# Patient Record
Sex: MALE | Race: WHITE | NOT HISPANIC OR LATINO | URBAN - METROPOLITAN AREA
[De-identification: names, ages, dates, MRNs, and addresses within clinical notes are randomized per-mention and may not be internally consistent; named-entity substitution may affect disease eponyms.]

---

## 2024-10-04 ENCOUNTER — HOSPITAL ENCOUNTER (OUTPATIENT)
Dept: RADIOLOGY | Facility: EXTERNAL LOCATION | Age: 54
Discharge: HOME | End: 2024-10-04

## 2024-10-04 ENCOUNTER — OFFICE VISIT (OUTPATIENT)
Dept: ORTHOPEDIC SURGERY | Facility: CLINIC | Age: 54
End: 2024-10-04
Payer: MEDICAID

## 2024-10-04 ENCOUNTER — HOSPITAL ENCOUNTER (OUTPATIENT)
Dept: RADIOLOGY | Facility: CLINIC | Age: 54
Discharge: HOME | End: 2024-10-04
Payer: MEDICAID

## 2024-10-04 VITALS — WEIGHT: 170 LBS | HEIGHT: 68 IN | BODY MASS INDEX: 25.76 KG/M2

## 2024-10-04 DIAGNOSIS — M25.562 ACUTE PAIN OF LEFT KNEE: ICD-10-CM

## 2024-10-04 DIAGNOSIS — M70.42 PREPATELLAR BURSITIS OF LEFT KNEE: Primary | ICD-10-CM

## 2024-10-04 PROCEDURE — 99203 OFFICE O/P NEW LOW 30 MIN: CPT | Performed by: FAMILY MEDICINE

## 2024-10-04 PROCEDURE — 73562 X-RAY EXAM OF KNEE 3: CPT | Mod: LT

## 2024-10-04 PROCEDURE — 3008F BODY MASS INDEX DOCD: CPT | Performed by: FAMILY MEDICINE

## 2024-10-04 PROCEDURE — 20611 DRAIN/INJ JOINT/BURSA W/US: CPT | Performed by: FAMILY MEDICINE

## 2024-10-04 RX ORDER — TRIAMCINOLONE ACETONIDE 40 MG/ML
20 INJECTION, SUSPENSION INTRA-ARTICULAR; INTRAMUSCULAR
Status: COMPLETED | OUTPATIENT
Start: 2024-10-04 | End: 2024-10-04

## 2024-10-04 RX ORDER — LIDOCAINE HYDROCHLORIDE 20 MG/ML
2 INJECTION, SOLUTION INFILTRATION; PERINEURAL
Status: COMPLETED | OUTPATIENT
Start: 2024-10-04 | End: 2024-10-04

## 2024-10-04 NOTE — PROGRESS NOTES
History of Present Illness   Chief Complaint   Patient presents with    Left Knee - Pain     Pain and swelling for at least 15 to twenty years       The patient is 54 y.o. male  here with a complaint of chronic left knee pain and swelling.  Greater than 15 years of symptoms, works as a , is on his knees throughout the day, he is always had swelling below his kneecap, feels like it has gotten worse recently with slightly more pain.  No specific treatment, says he does wear kneepads at work.  He denies any redness or warmth.  He takes over-the-counter medications as needed for pain.  Patient does have history of chronic opiate abuse, says he is planning to go to rehab next month for 30 days, says he is currently using fentanyl to help him sleep    No past medical history on file.    Medication Documentation Review Audit       Reviewed by Deena Mandel MA (Technologist) on 10/04/24 at 0942      Medication Order Taking? Sig Documenting Provider Last Dose Status            No Medications to Display                                   No Known Allergies    Social History     Socioeconomic History    Marital status: Single     Spouse name: Not on file    Number of children: Not on file    Years of education: Not on file    Highest education level: Not on file   Occupational History    Not on file   Tobacco Use    Smoking status: Every Day     Types: Cigarettes    Smokeless tobacco: Never   Substance and Sexual Activity    Alcohol use: Yes     Comment: rarely    Drug use: Not on file    Sexual activity: Not on file   Other Topics Concern    Not on file   Social History Narrative    Not on file     Social Determinants of Health     Financial Resource Strain: Not on File (8/25/2019)    Received from [facility]    Financial Resource Strain     Financial Resource Strain: 0   Food Insecurity: Not on File (8/25/2019)    Received from [facility]    Food Insecurity     Food: 0   Transportation Needs: Not on File (8/25/2019)     Received from [facility]    Transportation Needs     Transportation: 0   Physical Activity: Not on File (2019)    Received from [facility]    Physical Activity     Physical Activity: 0   Stress: Not on File (2019)    Received from [facility]    Stress     Stress: 0   Social Connections: Not on File (2019)    Received from [facility]    Social Connections     Social Connections and Isolation: 0   Intimate Partner Violence: Not on file   Housing Stability: Not on File (2019)    Received from [facility]    Housing Stability     Housin       No past surgical history on file.       Review of Systems   GENERAL: Negative  GI: Negative  MUSCULOSKELETAL: See HPI  SKIN: Negative  NEURO:  Negative     Physical Exam:    General/Constitutional: well appearing, no distress, appears stated age  HEENT: sclera clear  Respiratory: non labored breathing  Vascular: No edema, swelling or tenderness, except as noted in detailed exam.  Integumentary: No impressive skin lesions present, except as noted in detailed exam.  Neurological:  Alert and oriented   Psychological:  Normal mood and affect.  Musculoskeletal: Normal, except as noted in detailed exam and in HPI normal gait, unassisted    Left knee: There is some swelling, fluctuance at the prepatellar bursa, there is no overlying redness or warmth, there is some mild tenderness to palpation here.  He has good range of motion at the knee without pain, no motor deficit pain with strength testing, no gross ligamentous laxity       Imaging: X-rays of left knee obtained today and independently reviewed, there is mild degenerative changes of the knee joint, there is soft tissue swelling at the prepatellar bursa    L Inj/Asp: L patellar bursa on 10/4/2024 12:33 PM  Indications: pain  Details: 22 G needle, ultrasound-guided superolateral approach  Medications: 20 mg triamcinolone acetonide 40 mg/mL; 2 mL lidocaine 20 mg/mL (2 %)  Outcome: tolerated well, no immediate complications  Procedure,  treatment alternatives, risks and benefits explained, specific risks discussed. Consent was given by the patient. Immediately prior to procedure a time out was called to verify the correct patient, procedure, equipment, support staff and site/side marked as required. Patient was prepped and draped in the usual sterile fashion.               Assessment   1. Prepatellar bursitis of left knee        2. Acute pain of left knee  XR knee left 3 views    Point of Care Ultrasound            Plan: Left knee pain/swelling secondary to prepatellar bursitis secondary to kneeling as a .  Discussed further workup and treatment.  Explained that this is a benign condition, no treatment is necessary but given worsening pain, swelling we did proceed with ultrasound-guided bursa aspiration followed by Kenalog injection, that we did provide compression with Ace bandage, recommended he wear this for the next 2 weeks, continue to avoid direct pressure on his knees with work.  Discussed potential for symptoms to recur.  Patient voiced understanding.  He will follow-up as needed.

## 2024-12-02 ENCOUNTER — APPOINTMENT (OUTPATIENT)
Dept: NEUROSURGERY | Facility: CLINIC | Age: 54
End: 2024-12-02
Payer: MEDICAID

## 2024-12-10 ENCOUNTER — APPOINTMENT (OUTPATIENT)
Dept: RADIOLOGY | Facility: HOSPITAL | Age: 54
End: 2024-12-10
Payer: MEDICAID

## 2024-12-10 ENCOUNTER — HOSPITAL ENCOUNTER (INPATIENT)
Facility: HOSPITAL | Age: 54
LOS: 3 days | Discharge: HOME | End: 2024-12-14
Attending: STUDENT IN AN ORGANIZED HEALTH CARE EDUCATION/TRAINING PROGRAM | Admitting: INTERNAL MEDICINE
Payer: MEDICAID

## 2024-12-10 DIAGNOSIS — F41.9 ANXIETY: ICD-10-CM

## 2024-12-10 DIAGNOSIS — L03.211 CELLULITIS OF FACE: Primary | ICD-10-CM

## 2024-12-10 DIAGNOSIS — F11.90 OPIOID USE: ICD-10-CM

## 2024-12-10 LAB
ALBUMIN SERPL BCP-MCNC: 4.3 G/DL (ref 3.4–5)
ALP SERPL-CCNC: 92 U/L (ref 33–120)
ALT SERPL W P-5'-P-CCNC: 12 U/L (ref 10–52)
ANION GAP SERPL CALC-SCNC: 13 MMOL/L (ref 10–20)
AST SERPL W P-5'-P-CCNC: 10 U/L (ref 9–39)
BASOPHILS # BLD AUTO: 0.06 X10*3/UL (ref 0–0.1)
BASOPHILS NFR BLD AUTO: 0.5 %
BILIRUB SERPL-MCNC: 0.6 MG/DL (ref 0–1.2)
BUN SERPL-MCNC: 12 MG/DL (ref 6–23)
CALCIUM SERPL-MCNC: 9.5 MG/DL (ref 8.6–10.3)
CHLORIDE SERPL-SCNC: 102 MMOL/L (ref 98–107)
CO2 SERPL-SCNC: 24 MMOL/L (ref 21–32)
CREAT SERPL-MCNC: 1.28 MG/DL (ref 0.5–1.3)
EGFRCR SERPLBLD CKD-EPI 2021: 67 ML/MIN/1.73M*2
EOSINOPHIL # BLD AUTO: 0.2 X10*3/UL (ref 0–0.7)
EOSINOPHIL NFR BLD AUTO: 1.7 %
ERYTHROCYTE [DISTWIDTH] IN BLOOD BY AUTOMATED COUNT: 13.2 % (ref 11.5–14.5)
GLUCOSE SERPL-MCNC: 103 MG/DL (ref 74–99)
HCT VFR BLD AUTO: 37.2 % (ref 41–52)
HGB BLD-MCNC: 12.7 G/DL (ref 13.5–17.5)
IMM GRANULOCYTES # BLD AUTO: 0.05 X10*3/UL (ref 0–0.7)
IMM GRANULOCYTES NFR BLD AUTO: 0.4 % (ref 0–0.9)
LACTATE SERPL-SCNC: 0.8 MMOL/L (ref 0.4–2)
LYMPHOCYTES # BLD AUTO: 1.69 X10*3/UL (ref 1.2–4.8)
LYMPHOCYTES NFR BLD AUTO: 14.2 %
MCH RBC QN AUTO: 30.5 PG (ref 26–34)
MCHC RBC AUTO-ENTMCNC: 34.1 G/DL (ref 32–36)
MCV RBC AUTO: 89 FL (ref 80–100)
MONOCYTES # BLD AUTO: 0.97 X10*3/UL (ref 0.1–1)
MONOCYTES NFR BLD AUTO: 8.2 %
NEUTROPHILS # BLD AUTO: 8.89 X10*3/UL (ref 1.2–7.7)
NEUTROPHILS NFR BLD AUTO: 75 %
NRBC BLD-RTO: 0 /100 WBCS (ref 0–0)
PLATELET # BLD AUTO: 355 X10*3/UL (ref 150–450)
POTASSIUM SERPL-SCNC: 4.9 MMOL/L (ref 3.5–5.3)
PROT SERPL-MCNC: 6.8 G/DL (ref 6.4–8.2)
RBC # BLD AUTO: 4.17 X10*6/UL (ref 4.5–5.9)
SODIUM SERPL-SCNC: 134 MMOL/L (ref 136–145)
WBC # BLD AUTO: 11.9 X10*3/UL (ref 4.4–11.3)

## 2024-12-10 PROCEDURE — 76377 3D RENDER W/INTRP POSTPROCES: CPT

## 2024-12-10 PROCEDURE — 96365 THER/PROPH/DIAG IV INF INIT: CPT

## 2024-12-10 PROCEDURE — 2500000004 HC RX 250 GENERAL PHARMACY W/ HCPCS (ALT 636 FOR OP/ED): Performed by: STUDENT IN AN ORGANIZED HEALTH CARE EDUCATION/TRAINING PROGRAM

## 2024-12-10 PROCEDURE — 83036 HEMOGLOBIN GLYCOSYLATED A1C: CPT

## 2024-12-10 PROCEDURE — 2500000001 HC RX 250 WO HCPCS SELF ADMINISTERED DRUGS (ALT 637 FOR MEDICARE OP): Performed by: INTERNAL MEDICINE

## 2024-12-10 PROCEDURE — 96375 TX/PRO/DX INJ NEW DRUG ADDON: CPT

## 2024-12-10 PROCEDURE — 85025 COMPLETE CBC W/AUTO DIFF WBC: CPT | Performed by: NURSE PRACTITIONER

## 2024-12-10 PROCEDURE — 2500000002 HC RX 250 W HCPCS SELF ADMINISTERED DRUGS (ALT 637 FOR MEDICARE OP, ALT 636 FOR OP/ED)

## 2024-12-10 PROCEDURE — 70486 CT MAXILLOFACIAL W/O DYE: CPT

## 2024-12-10 PROCEDURE — G0378 HOSPITAL OBSERVATION PER HR: HCPCS

## 2024-12-10 PROCEDURE — 36415 COLL VENOUS BLD VENIPUNCTURE: CPT | Performed by: NURSE PRACTITIONER

## 2024-12-10 PROCEDURE — 80053 COMPREHEN METABOLIC PANEL: CPT | Performed by: NURSE PRACTITIONER

## 2024-12-10 PROCEDURE — 99223 1ST HOSP IP/OBS HIGH 75: CPT

## 2024-12-10 PROCEDURE — 99285 EMERGENCY DEPT VISIT HI MDM: CPT | Mod: 25 | Performed by: STUDENT IN AN ORGANIZED HEALTH CARE EDUCATION/TRAINING PROGRAM

## 2024-12-10 PROCEDURE — 96367 TX/PROPH/DG ADDL SEQ IV INF: CPT

## 2024-12-10 PROCEDURE — 2500000004 HC RX 250 GENERAL PHARMACY W/ HCPCS (ALT 636 FOR OP/ED)

## 2024-12-10 PROCEDURE — 83605 ASSAY OF LACTIC ACID: CPT | Performed by: NURSE PRACTITIONER

## 2024-12-10 RX ORDER — BUPRENORPHINE 2 MG/1
4 TABLET SUBLINGUAL DAILY
Status: DISCONTINUED | OUTPATIENT
Start: 2024-12-11 | End: 2024-12-14 | Stop reason: HOSPADM

## 2024-12-10 RX ORDER — VANCOMYCIN HYDROCHLORIDE 1 G/200ML
15 INJECTION, SOLUTION INTRAVENOUS ONCE
Status: COMPLETED | OUTPATIENT
Start: 2024-12-10 | End: 2024-12-10

## 2024-12-10 RX ORDER — BUPRENORPHINE HYDROCHLORIDE 8 MG/1
8 TABLET SUBLINGUAL NIGHTLY
Status: DISCONTINUED | OUTPATIENT
Start: 2024-12-10 | End: 2024-12-14 | Stop reason: HOSPADM

## 2024-12-10 RX ORDER — QUETIAPINE FUMARATE 100 MG/1
100 TABLET, FILM COATED ORAL NIGHTLY
Status: DISCONTINUED | OUTPATIENT
Start: 2024-12-10 | End: 2024-12-14 | Stop reason: HOSPADM

## 2024-12-10 RX ORDER — BUPRENORPHINE HYDROCHLORIDE AND NALOXONE HYDROCHLORIDE DIHYDRATE 8; 2 MG/1; MG/1
0.5 TABLET SUBLINGUAL EVERY MORNING
COMMUNITY

## 2024-12-10 RX ORDER — OXYCODONE AND ACETAMINOPHEN 5; 325 MG/1; MG/1
1 TABLET ORAL ONCE
Status: DISCONTINUED | OUTPATIENT
Start: 2024-12-10 | End: 2024-12-11

## 2024-12-10 RX ORDER — ACETAMINOPHEN 650 MG/1
650 SUPPOSITORY RECTAL EVERY 4 HOURS PRN
Status: DISCONTINUED | OUTPATIENT
Start: 2024-12-10 | End: 2024-12-11

## 2024-12-10 RX ORDER — OXYCODONE AND ACETAMINOPHEN 5; 325 MG/1; MG/1
1 TABLET ORAL EVERY 6 HOURS PRN
Status: DISCONTINUED | OUTPATIENT
Start: 2024-12-10 | End: 2024-12-10

## 2024-12-10 RX ORDER — DOXEPIN HYDROCHLORIDE 50 MG/1
50 CAPSULE ORAL NIGHTLY
COMMUNITY
Start: 2024-12-06

## 2024-12-10 RX ORDER — HYDROMORPHONE HYDROCHLORIDE 0.2 MG/ML
0.2 INJECTION INTRAMUSCULAR; INTRAVENOUS; SUBCUTANEOUS EVERY 4 HOURS PRN
Status: DISCONTINUED | OUTPATIENT
Start: 2024-12-10 | End: 2024-12-10

## 2024-12-10 RX ORDER — KETOROLAC TROMETHAMINE 30 MG/ML
7.5 INJECTION, SOLUTION INTRAMUSCULAR; INTRAVENOUS EVERY 8 HOURS PRN
Status: DISCONTINUED | OUTPATIENT
Start: 2024-12-10 | End: 2024-12-11

## 2024-12-10 RX ORDER — ACETAMINOPHEN 325 MG/1
650 TABLET ORAL EVERY 4 HOURS PRN
Status: DISCONTINUED | OUTPATIENT
Start: 2024-12-10 | End: 2024-12-11

## 2024-12-10 RX ORDER — BUPRENORPHINE HYDROCHLORIDE AND NALOXONE HYDROCHLORIDE DIHYDRATE 8; 2 MG/1; MG/1
1 TABLET SUBLINGUAL EVERY EVENING
Status: DISCONTINUED | OUTPATIENT
Start: 2024-12-10 | End: 2024-12-10

## 2024-12-10 RX ORDER — KETOROLAC TROMETHAMINE 30 MG/ML
15 INJECTION, SOLUTION INTRAMUSCULAR; INTRAVENOUS EVERY 8 HOURS PRN
Status: DISCONTINUED | OUTPATIENT
Start: 2024-12-10 | End: 2024-12-11

## 2024-12-10 RX ORDER — KETOROLAC TROMETHAMINE 30 MG/ML
15 INJECTION, SOLUTION INTRAMUSCULAR; INTRAVENOUS EVERY 6 HOURS PRN
Status: DISCONTINUED | OUTPATIENT
Start: 2024-12-10 | End: 2024-12-10

## 2024-12-10 RX ORDER — VANCOMYCIN HYDROCHLORIDE 1.25 G/250ML
1250 INJECTION, SOLUTION INTRAVITREAL EVERY 24 HOURS
Status: DISCONTINUED | OUTPATIENT
Start: 2024-12-11 | End: 2024-12-13

## 2024-12-10 RX ORDER — KETOROLAC TROMETHAMINE 30 MG/ML
30 INJECTION, SOLUTION INTRAMUSCULAR; INTRAVENOUS EVERY 6 HOURS PRN
Status: DISCONTINUED | OUTPATIENT
Start: 2024-12-10 | End: 2024-12-10

## 2024-12-10 RX ORDER — ACETAMINOPHEN 160 MG/5ML
650 SOLUTION ORAL EVERY 4 HOURS PRN
Status: DISCONTINUED | OUTPATIENT
Start: 2024-12-10 | End: 2024-12-11

## 2024-12-10 RX ORDER — DOXYCYCLINE 100 MG/1
100 CAPSULE ORAL 2 TIMES DAILY
COMMUNITY
Start: 2024-12-09 | End: 2024-12-23

## 2024-12-10 RX ORDER — VANCOMYCIN HYDROCHLORIDE 1 G/20ML
INJECTION, POWDER, LYOPHILIZED, FOR SOLUTION INTRAVENOUS DAILY PRN
Status: DISCONTINUED | OUTPATIENT
Start: 2024-12-10 | End: 2024-12-14 | Stop reason: HOSPADM

## 2024-12-10 RX ORDER — ENOXAPARIN SODIUM 100 MG/ML
40 INJECTION SUBCUTANEOUS EVERY 24 HOURS
Status: DISCONTINUED | OUTPATIENT
Start: 2024-12-10 | End: 2024-12-11

## 2024-12-10 RX ORDER — DOXEPIN HYDROCHLORIDE 50 MG/1
50 CAPSULE ORAL NIGHTLY
Status: DISCONTINUED | OUTPATIENT
Start: 2024-12-10 | End: 2024-12-14 | Stop reason: HOSPADM

## 2024-12-10 RX ORDER — VANCOMYCIN HYDROCHLORIDE 1 G/20ML
INJECTION, POWDER, LYOPHILIZED, FOR SOLUTION INTRAVENOUS DAILY PRN
Status: DISCONTINUED | OUTPATIENT
Start: 2024-12-10 | End: 2024-12-10

## 2024-12-10 RX ORDER — QUETIAPINE FUMARATE 50 MG/1
100 TABLET, FILM COATED ORAL NIGHTLY
COMMUNITY
Start: 2024-12-06

## 2024-12-10 RX ORDER — BUPRENORPHINE HYDROCHLORIDE AND NALOXONE HYDROCHLORIDE DIHYDRATE 8; 2 MG/1; MG/1
1 TABLET SUBLINGUAL EVERY EVENING
COMMUNITY

## 2024-12-10 SDOH — SOCIAL STABILITY: SOCIAL INSECURITY: ABUSE: ADULT

## 2024-12-10 SDOH — ECONOMIC STABILITY: HOUSING INSECURITY: IN THE LAST 12 MONTHS, WAS THERE A TIME WHEN YOU WERE NOT ABLE TO PAY THE MORTGAGE OR RENT ON TIME?: NO

## 2024-12-10 SDOH — SOCIAL STABILITY: SOCIAL INSECURITY: WITHIN THE LAST YEAR, HAVE YOU BEEN HUMILIATED OR EMOTIONALLY ABUSED IN OTHER WAYS BY YOUR PARTNER OR EX-PARTNER?: NO

## 2024-12-10 SDOH — ECONOMIC STABILITY: HOUSING INSECURITY: AT ANY TIME IN THE PAST 12 MONTHS, WERE YOU HOMELESS OR LIVING IN A SHELTER (INCLUDING NOW)?: NO

## 2024-12-10 SDOH — SOCIAL STABILITY: SOCIAL INSECURITY: WERE YOU ABLE TO COMPLETE ALL THE BEHAVIORAL HEALTH SCREENINGS?: YES

## 2024-12-10 SDOH — ECONOMIC STABILITY: HOUSING INSECURITY: IN THE PAST 12 MONTHS, HOW MANY TIMES HAVE YOU MOVED WHERE YOU WERE LIVING?: 2

## 2024-12-10 SDOH — SOCIAL STABILITY: SOCIAL INSECURITY: ARE YOU OR HAVE YOU BEEN THREATENED OR ABUSED PHYSICALLY, EMOTIONALLY, OR SEXUALLY BY ANYONE?: NO

## 2024-12-10 SDOH — SOCIAL STABILITY: SOCIAL INSECURITY: DO YOU FEEL UNSAFE GOING BACK TO THE PLACE WHERE YOU ARE LIVING?: NO

## 2024-12-10 SDOH — ECONOMIC STABILITY: INCOME INSECURITY: IN THE PAST 12 MONTHS HAS THE ELECTRIC, GAS, OIL, OR WATER COMPANY THREATENED TO SHUT OFF SERVICES IN YOUR HOME?: NO

## 2024-12-10 SDOH — ECONOMIC STABILITY: TRANSPORTATION INSECURITY: IN THE PAST 12 MONTHS, HAS LACK OF TRANSPORTATION KEPT YOU FROM MEDICAL APPOINTMENTS OR FROM GETTING MEDICATIONS?: NO

## 2024-12-10 SDOH — ECONOMIC STABILITY: FOOD INSECURITY: WITHIN THE PAST 12 MONTHS, YOU WORRIED THAT YOUR FOOD WOULD RUN OUT BEFORE YOU GOT THE MONEY TO BUY MORE.: NEVER TRUE

## 2024-12-10 SDOH — SOCIAL STABILITY: SOCIAL INSECURITY
WITHIN THE LAST YEAR, HAVE YOU BEEN KICKED, HIT, SLAPPED, OR OTHERWISE PHYSICALLY HURT BY YOUR PARTNER OR EX-PARTNER?: NO

## 2024-12-10 SDOH — SOCIAL STABILITY: SOCIAL INSECURITY
WITHIN THE LAST YEAR, HAVE YOU BEEN RAPED OR FORCED TO HAVE ANY KIND OF SEXUAL ACTIVITY BY YOUR PARTNER OR EX-PARTNER?: NO

## 2024-12-10 SDOH — SOCIAL STABILITY: SOCIAL INSECURITY: WITHIN THE LAST YEAR, HAVE YOU BEEN AFRAID OF YOUR PARTNER OR EX-PARTNER?: NO

## 2024-12-10 SDOH — SOCIAL STABILITY: SOCIAL INSECURITY: HAS ANYONE EVER THREATENED TO HURT YOUR FAMILY OR YOUR PETS?: NO

## 2024-12-10 SDOH — SOCIAL STABILITY: SOCIAL INSECURITY: DO YOU FEEL ANYONE HAS EXPLOITED OR TAKEN ADVANTAGE OF YOU FINANCIALLY OR OF YOUR PERSONAL PROPERTY?: NO

## 2024-12-10 SDOH — SOCIAL STABILITY: SOCIAL INSECURITY: HAVE YOU HAD ANY THOUGHTS OF HARMING ANYONE ELSE?: NO

## 2024-12-10 SDOH — ECONOMIC STABILITY: FOOD INSECURITY: HOW HARD IS IT FOR YOU TO PAY FOR THE VERY BASICS LIKE FOOD, HOUSING, MEDICAL CARE, AND HEATING?: NOT VERY HARD

## 2024-12-10 SDOH — SOCIAL STABILITY: SOCIAL INSECURITY: ARE THERE ANY APPARENT SIGNS OF INJURIES/BEHAVIORS THAT COULD BE RELATED TO ABUSE/NEGLECT?: NO

## 2024-12-10 SDOH — SOCIAL STABILITY: SOCIAL INSECURITY: DOES ANYONE TRY TO KEEP YOU FROM HAVING/CONTACTING OTHER FRIENDS OR DOING THINGS OUTSIDE YOUR HOME?: NO

## 2024-12-10 SDOH — SOCIAL STABILITY: SOCIAL INSECURITY: HAVE YOU HAD THOUGHTS OF HARMING ANYONE ELSE?: NO

## 2024-12-10 SDOH — ECONOMIC STABILITY: FOOD INSECURITY: WITHIN THE PAST 12 MONTHS, THE FOOD YOU BOUGHT JUST DIDN'T LAST AND YOU DIDN'T HAVE MONEY TO GET MORE.: NEVER TRUE

## 2024-12-10 ASSESSMENT — ACTIVITIES OF DAILY LIVING (ADL)
JUDGMENT_ADEQUATE_SAFELY_COMPLETE_DAILY_ACTIVITIES: YES
PATIENT'S MEMORY ADEQUATE TO SAFELY COMPLETE DAILY ACTIVITIES?: YES
GROOMING: INDEPENDENT
LACK_OF_TRANSPORTATION: NO
FEEDING YOURSELF: INDEPENDENT
BATHING: INDEPENDENT
HEARING - LEFT EAR: FUNCTIONAL
LACK_OF_TRANSPORTATION: NO
WALKS IN HOME: INDEPENDENT
DRESSING YOURSELF: INDEPENDENT
ADEQUATE_TO_COMPLETE_ADL: YES
HEARING - RIGHT EAR: FUNCTIONAL
TOILETING: INDEPENDENT

## 2024-12-10 ASSESSMENT — LIFESTYLE VARIABLES
HOW MANY STANDARD DRINKS CONTAINING ALCOHOL DO YOU HAVE ON A TYPICAL DAY: PATIENT DOES NOT DRINK
AUDIT-C TOTAL SCORE: 0
SUBSTANCE_ABUSE_PAST_12_MONTHS: YES
SKIP TO QUESTIONS 9-10: 1
HOW OFTEN DO YOU HAVE A DRINK CONTAINING ALCOHOL: NEVER
AUDIT-C TOTAL SCORE: 0
PRESCIPTION_ABUSE_PAST_12_MONTHS: NO
HOW OFTEN DO YOU HAVE 6 OR MORE DRINKS ON ONE OCCASION: NEVER

## 2024-12-10 ASSESSMENT — COGNITIVE AND FUNCTIONAL STATUS - GENERAL
DAILY ACTIVITIY SCORE: 24
MOBILITY SCORE: 24
PATIENT BASELINE BEDBOUND: NO

## 2024-12-10 ASSESSMENT — PATIENT HEALTH QUESTIONNAIRE - PHQ9
2. FEELING DOWN, DEPRESSED OR HOPELESS: NOT AT ALL
SUM OF ALL RESPONSES TO PHQ9 QUESTIONS 1 & 2: 0
1. LITTLE INTEREST OR PLEASURE IN DOING THINGS: NOT AT ALL

## 2024-12-10 ASSESSMENT — COLUMBIA-SUICIDE SEVERITY RATING SCALE - C-SSRS
2. HAVE YOU ACTUALLY HAD ANY THOUGHTS OF KILLING YOURSELF?: NO
1. IN THE PAST MONTH, HAVE YOU WISHED YOU WERE DEAD OR WISHED YOU COULD GO TO SLEEP AND NOT WAKE UP?: NO
6. HAVE YOU EVER DONE ANYTHING, STARTED TO DO ANYTHING, OR PREPARED TO DO ANYTHING TO END YOUR LIFE?: NO
2. HAVE YOU ACTUALLY HAD ANY THOUGHTS OF KILLING YOURSELF?: NO
1. IN THE PAST MONTH, HAVE YOU WISHED YOU WERE DEAD OR WISHED YOU COULD GO TO SLEEP AND NOT WAKE UP?: NO
6. HAVE YOU EVER DONE ANYTHING, STARTED TO DO ANYTHING, OR PREPARED TO DO ANYTHING TO END YOUR LIFE?: NO

## 2024-12-10 ASSESSMENT — PAIN SCALES - GENERAL
PAINLEVEL_OUTOF10: 3
PAINLEVEL_OUTOF10: 0 - NO PAIN

## 2024-12-10 ASSESSMENT — PAIN - FUNCTIONAL ASSESSMENT
PAIN_FUNCTIONAL_ASSESSMENT: 0-10
PAIN_FUNCTIONAL_ASSESSMENT: 0-10

## 2024-12-10 NOTE — PROGRESS NOTES
Pharmacy Medication History Review    Valente Viera is a 54 y.o. male admitted for No Principal Problem: There is no principal problem currently on the Problem List. Please update the Problem List and refresh.. Pharmacy reviewed the patient's [id number] medications and allergies for accuracy.    The list below reflectives the updated PTA list. Please review each medication in order reconciliation for additional clarification and justification.  Prior to Admission medications    Medication Sig Start Date End Date Authorizing Provider   buprenorphine-naloxone (Suboxone) 8-2 mg SL tablet Place 0.5 tablets under the tongue once daily in the morning.   Historical Provider, MD   doxepin (SINEquan) 50 mg capsule Take 1 capsule (50 mg) by mouth once daily at bedtime. 12/6/24  Historical Provider, MD   doxycycline (Monodox) 100 mg capsule Take 1 capsule (100 mg) by mouth 2 times a day. For 14 days 12/9/24 12/23/24 Historical Provider, MD   QUEtiapine (SEROquel) 50 mg tablet Take 2 tablets (100 mg) by mouth once daily at bedtime. 12/6/24  Historical Provider, MD   buprenorphine-naloxone (Suboxone) 8-2 mg SL tablet Place 1 tablet under the tongue once daily in the evening.   Historical Provider, MD        The list below reflectives the updated allergy list. Please review each documented allergy for additional clarification and justification.  Allergies  Reviewed by Shira Del Toro RN on 12/10/2024   No Known Allergies         Below are additional concerns with the patient's PTA list.      Alyssa Navas

## 2024-12-10 NOTE — H&P
History Of Present Illness  Valente Viera is a 54 y.o. male presenting to [facility] on 12/10 for significant upper lip swelling.  Does not know what triggered this, thanks this may be from a boil or ingrown hair or even a cold sore.  Complains of the pain and rates it 8/9 out of 10, describes it as a fullness that gets worse with movement/talking.  He did not pick at it/exacerbate the area by touching it.   Does endorse not being able to eat for the past few days to the pain.  Denies F/C, CP/SOB, N/V, changes in urination/bowel movement.  Patient said the last time he used opioids was October 17.  Currently on a 60-day drug rehab program.  Patient has been on a week of doxycycline therapy.    Unremarkable labs on arrival.  Hemodynamically stable.  ED started vancomycin/Zosyn.     Assessment/Plan   Valente Viera is a 54 y.o. male presenting to [facility] on 12/10 for significant upper lip swelling.    #Upper lip swelling  #Cellulitis concern  -Failed outpatient doxycycline ABX  -CT findings unremarkable for abscess, however concern for cellulitis extending to left upper cheek.  Mild leukocytosis  -Vancomycin/Zosyn 12/10-, pain control (Toradol 7.5/15)  -ID consult pending     #Chronic opioid/heroin abuse-suboxone.  Prior overdose admissions  #Anxiety/depression-Doxepin, Seroquel   #Chronic left knee pain    Inpatient Checklist  Code Status: Full  DVT prophylaxis: Lovenox  Diet: Regular  Disposition: Medicine     Lizabeth Matthews, DO  PGY-2, Internal Medicine  Please SecureChat for any further questions  This is a preliminary note, please await attending attestation for final A/P    Surgical History  He has no past surgical history on file.     Social History  Former 1 PPD smoker, chronic opioid abuse on Suboxone    Family History  No family history on file.     Allergies  Patient has no known allergies.     Physical Exam    General:  Pleasant and cooperative. No apparent distress.  HEENT: Upper lip severely swollen,  appears slightly pus filled  Chest:  Clear to auscultation. Bilateral and appropriate chest rise  CV:  Regular rate and rhythm.    Abdomen: Abdomen is soft, non-tender, non-distended. BS +   Extremities:  No lower extremity edema or cyanosis.   Neurological:  Conversing and answering questions appropriately   Skin:  Warm and dry.     Last Recorded Vitals  /77 (BP Location: Right arm, Patient Position: Sitting)   Pulse 97   Temp 36.5 °C (97.7 °F) (Tympanic)   Resp 18   Wt 72.6 kg (160 lb)   SpO2 97%

## 2024-12-10 NOTE — CONSULTS
Vancomycin Dosing by Pharmacy- INITIAL    Valente Viera is a 54 y.o. year old male who Pharmacy has been consulted for vancomycin dosing for cellulitis, skin and soft tissue. Based on the patient's indication and renal status this patient will be dosed based on a goal AUC of 400-600.     Renal function is currently stable.    Visit Vitals  /77 (BP Location: Right arm, Patient Position: Sitting)   Pulse 97   Temp 36.5 °C (97.7 °F) (Tympanic)   Resp 18        Lab Results   Component Value Date    CREATININE 1.28 12/10/2024        Patient weight is as follows:   Vitals:    12/10/24 1216   Weight: 72.6 kg (160 lb)       Cultures:  No results found for the encounter in last 14 days.        No intake/output data recorded.  I/O during current shift:  No intake/output data recorded.    Temp (24hrs), Av.5 °C (97.7 °F), Min:36.5 °C (97.7 °F), Max:36.5 °C (97.7 °F)         Assessment/Plan     Patient has already been given a loading dose of 1000 mg.  Will initiate vancomycin maintenance, a loading dose of 1000 mg followed by a dose of 1500 mg 24 hours later.    This dosing regimen is predicted by [employer] to result in the following pharmacokinetic parameters:    BjpqNfrqvf=6303ms(20.7mg/kg)/24hours    AUC range: 447mg/L.hr-502mg/L.hr Vtrough=14mg/L   Probabilities:   AUC=75% Toxicity=21%        Follow-up level will be ordered on  at am unless clinically indicated sooner.  Will continue to monitor renal function daily while on vancomycin and order serum creatinine at least every 48 hours if not already ordered.  Follow for continued vancomycin needs, clinical response, and signs/symptoms of toxicity.       Sukhjinder Martino, BienvenidoD

## 2024-12-10 NOTE — ED PROVIDER NOTES
"EMERGENCY DEPARTMENT ENCOUNTER      Pt Name: Valente Viera  MRN: [id number]  Birthdate 1970  Date of evaluation: 12/10/2024  Provider: Olvin Potter DO    CHIEF COMPLAINT       Chief Complaint   Patient presents with    Abscess    Oral Swelling       HISTORY OF PRESENT ILLNESS    Valente Viera is a 54 y.o. male who presents to the emergency department with Himself for upper lip swelling as well as redness.  Patient endorses he has history of abscesses last was in the groin never any on the face.  He does have history of cold sores as well.  Does not feel that this started with a cold sore.  He reports it started with a \"zit\".  He has had no associated fevers chills nausea or vomiting no difficulty swallowing infection or associated shortness of breath.  He has been on doxycycline for nearly a week without any improvement swelling has slightly worsened.  This prompted him to the emergency department for further evaluation.          Nursing Notes were reviewed.    REVIEW OF SYSTEMS     CONSTITUTIONAL: Denies fever, sweats, chills.   NEURO: Denies difficulty walking, numbness, weakness, tingling, headache.   HEENT: Denies sore throat, rhinorrhea, changes in vision.   CARDIO: Denies chest pain, palpitations.  PULM: Denies shortness of breath, cough.   GI: Denies abdominal pain, nausea, vomiting, diarrhea, constipation, melena, hematochezia.  : Denies painful urination, frequency, hematuria.   MSK: Denies recent trauma.   SKIN: Endorses facial swelling and redness.    ENDOCRINE: Denies unexpected weight-loss.   HEME: Denies bleeding disorder.     PAST MEDICAL HISTORY     Past Medical History:   Diagnosis Date    Opiate abuse, continuous (Multi)        SURGICAL HISTORY     History reviewed. No pertinent surgical history.    ALLERGIES     Patient has no known allergies.    FAMILY HISTORY     No family history on file.     SOCIAL HISTORY       Social History     Socioeconomic History    Marital status: " Single   Tobacco Use    Smoking status: Every Day     Types: Cigarettes    Smokeless tobacco: Never   Substance and Sexual Activity    Alcohol use: Yes     Comment: rarely     Social Drivers of Health     Financial Resource Strain: Low Risk  (12/10/2024)    Overall Financial Resource Strain (CARDIA)     Difficulty of Paying Living Expenses: Not very hard   Food Insecurity: No Food Insecurity (12/10/2024)    Hunger Vital Sign     Worried About Running Out of Food in the Last Year: Never true     Ran Out of Food in the Last Year: Never true   Transportation Needs: No Transportation Needs (12/10/2024)    PRAPARE - Transportation     Lack of Transportation (Medical): No     Lack of Transportation (Non-Medical): No   Physical Activity: Not on File (8/25/2019)    Received from [facility]    Physical Activity     Physical Activity: 0   Stress: Not on File (8/25/2019)    Received from [facility]    Stress     Stress: 0   Social Connections: Not on File (8/25/2019)    Received from [facility]    Social Connections     Social Connections and Isolation: 0   Intimate Partner Violence: Not At Risk (12/10/2024)    Humiliation, Afraid, Rape, and Kick questionnaire     Fear of Current or Ex-Partner: No     Emotionally Abused: No     Physically Abused: No     Sexually Abused: No   Housing Stability: High Risk (12/10/2024)    Housing Stability Vital Sign     Unable to Pay for Housing in the Last Year: No     Number of Times Moved in the Last Year: 2     Homeless in the Last Year: No       PHYSICAL EXAM   VS: As documented in the triage note from today's date and EMR flowsheet were reviewed.  Gen: Well developed. No acute distress. Seated in bed. Appears nontoxic.   Skin: Warm. Dry. Intact. No rashes or lesions.  Findings consistent with cellulitis upper lip minimal skin breakdown.  No evidence of fluctuance.  No evidence of necrotizing infection not rapidly expanding no bulla.  Eyes: Pupils equally round and reactive to light. Clear sclera.  EOMI.  HENT: Atraumatic appearance. Mucosal membranes moist. No oral lesions, uvula midline, airway patent.  No appreciable dental caries all teeth are absent.  TMs clear bilaterally nares clear bilaterally.  No evidence of PTA or Tash's.  CV: Regular rate and regular rhythm. S1, S2. No pedal edema. Warm extremities.  Resp: Nonlabored breathing Clear to auscultation bilaterally. No increased work of breathing.   GI: Soft and nontender. No rebound or guarding. Bowel sounds x4 present.   MSK: Symmetric muscle bulk. No joint swelling in the extremities. Compartments are soft. Neurovascularly intact x4 extremities. Radial pulses +2 equal bilaterally.   Neuro: Alert. CN II - XII intact. Speech fluent. Moving all extremities. No focal deficits. Gait normal.  Psych: Appropriate. Kempt.    DIAGNOSTIC RESULTS   RADIOLOGY:   Non-plain film images such as CT, Ultrasound and MRI are read by the radiologist. Plain radiographic images are visualized and preliminarily interpreted by the emergency physician with the below findings:      Interpretation per the Radiologist below, if available at the time of this note:    CT maxillofacial bones wo IV contrast   Final Result   Addendum (preliminary) 1 of 1   Interpreted By:  Kristopher Sousa,    ADDENDUM:   3D reconstructions were performed on an independent workstation and   provided for review.        Signed by: Kristopher Sousa 12/10/2024 3:15 PM        -------- ORIGINAL REPORT --------   Dictation workstation:   [id number]      Final   Enlargement of the upper lip most likely from phlegmon, with   surrounding subcutaneous reticulation probably from cellulitis   including extending to the left upper cheek. Additional findings as   above.        Signed by: Kristopher Sousa 12/10/2024 2:15 PM   Dictation workstation:   [id number]      CT 3D reconstruction    (Results Pending)         ED BEDSIDE ULTRASOUND:   Performed by ED Physician - none    LABS:  Labs Reviewed   CBC WITH AUTO  "DIFFERENTIAL - Abnormal       Result Value    WBC 11.9 (*)     nRBC 0.0      RBC 4.17 (*)     Hemoglobin 12.7 (*)     Hematocrit 37.2 (*)     MCV 89      MCH 30.5      MCHC 34.1      RDW 13.2      Platelets 355      Neutrophils % 75.0      Immature Granulocytes %, Automated 0.4      Lymphocytes % 14.2      Monocytes % 8.2      Eosinophils % 1.7      Basophils % 0.5      Neutrophils Absolute 8.89 (*)     Immature Granulocytes Absolute, Automated 0.05      Lymphocytes Absolute 1.69      Monocytes Absolute 0.97      Eosinophils Absolute 0.20      Basophils Absolute 0.06     COMPREHENSIVE METABOLIC PANEL - Abnormal    Glucose 103 (*)     Sodium 134 (*)     Potassium 4.9      Chloride 102      Bicarbonate 24      Anion Gap 13      Urea Nitrogen 12      Creatinine 1.28      eGFR 67      Calcium 9.5      Albumin 4.3      Alkaline Phosphatase 92      Total Protein 6.8      AST 10      Bilirubin, Total 0.6      ALT 12     LACTATE - Normal    Lactate 0.8      Narrative:     Venipuncture immediately after or during the administration of Metamizole may lead to falsely low results. Testing should be performed immediately prior to Metamizole dosing.       All other labs were within normal range or not returned as of this dictation.    EMERGENCY DEPARTMENT COURSE/MDM:   Vitals:    Vitals:    12/10/24 1216   BP: 106/77   BP Location: Right arm   Patient Position: Sitting   Pulse: 97   Resp: 18   Temp: 36.5 °C (97.7 °F)   TempSrc: Tympanic   SpO2: 97%   Weight: 72.6 kg (160 lb)   Height: 1.727 m (5' 8\")       I reviewed the patient's triage vitals and they are within normal range.    Due to the above findings the following was ordered basic labs include CT imaging of the facial structures as well as broad-spectrum antibiotics vancomycin and Zosyn.    Lab work does show a leukocytosis suspected source is cellulitis of the upper lip.  Patient does have a mild anemia no active signs of bleeding hemodynamically stable recommend close " outpatient follow-up.  He has no significant electrolyte derangements renal function is appropriate.  CT imaging does have multiple incidental findings which were thoroughly discussed with the patient recommended close outpatient follow-up.  He does have findings consistent with phlegmon therefore will need to be admitted for failed outpatient antibiotic treatment.  Patient is resting comfortably at this time protecting airway.  No indication for emergent ENT consultation there is no abscess to drain either.  Patient agreeable with hospital admission and appreciative of care.  Spoke with the admitting hospitalist who is agreed to accept the patient they did take over care at time of admission order.    Diagnoses as of 12/10/24 1605   Cellulitis of face       Patient was counseled regarding labs, imaging, likely diagnosis, and plan. All questions were answered.     ------------------------------------------------------------------  Information provided by the patient and family member  Consults hospitalist  Past medical history complicating workup HSV  Previous medical records reviewed office visit 10/4/2024  Considered incision and drainage although no evidence of fluctuance or abscess on CT.  Shared medical decision making patient agreeable with hospital admission for IV antibiotics for failed outpatient treatment.  ------------------------------------------------------------------  ED Medications administered this visit:    Medications   vancomycin (Vancocin) pharmacy to dose - pharmacy monitoring (has no administration in time range)   vancomycin (Vancocin) 1,000 mg in dextrose 5%  mL (has no administration in time range)   QUEtiapine (SEROquel) tablet 100 mg (has no administration in time range)   doxepin (SINEquan) capsule 50 mg (has no administration in time range)   acetaminophen (Tylenol) tablet 650 mg (has no administration in time range)     Or   acetaminophen (Tylenol) oral liquid 650 mg (has no  administration in time range)     Or   acetaminophen (Tylenol) suppository 650 mg (has no administration in time range)   buprenorphine (Subtex) SL tablet 8 mg (has no administration in time range)   buprenorphine (Subutex) SL tablet 4 mg (has no administration in time range)   ketorolac (Toradol) injection 15 mg (has no administration in time range)   ketorolac (Toradol) injection 30 mg (30 mg intravenous Given 12/10/24 1601)   enoxaparin (Lovenox) syringe 40 mg (40 mg subcutaneous Not Given 12/10/24 1601)   psyllium (Metamucil) 3.4 gram packet 1 packet (1 packet oral Not Given 12/10/24 1602)   piperacillin-tazobactam (Zosyn) 3.375 g in dextrose (iso) IV 50 mL (0 g intravenous Stopped 12/10/24 1553)     Final Impression:   1. Cellulitis of face          Olvin Potter DO    (Please note that portions of this note were completed with a voice recognition program.  Efforts were made to edit the dictations but occasionally words are mis-transcribed.)     Olvin Potter DO  12/10/24 1605

## 2024-12-10 NOTE — ED TRIAGE NOTES
Pt presents to ED for upper lip swelling x4 days. Pt states he had a boil or ingrown hair on upper lip which has causing upper lip to continue to swell.

## 2024-12-10 NOTE — CONSULTS
Vancomycin Dosing by Pharmacy- EMERGENCY DEPARTMENT    Valente Viera is a 54 y.o. year old male who Pharmacy has been consulted to give a ONE TIME ONLY vancomycin dose in the Emergency Department for cellulitis, skin and soft tissue.     Visit Vitals  /77 (BP Location: Right arm, Patient Position: Sitting)   Pulse 97   Temp 36.5 °C (97.7 °F) (Tympanic)   Resp 18      Lab Results   Component Value Date    CREATININE 1.28 12/10/2024      Wt Readings from Last 1 Encounters:   12/10/24 72.6 kg (160 lb)        Assessment/Plan     Patient will be given a one time dose of 1,000 mg  Pharmacy will sign off at this time. If vancomycin is to be continued, please re-consult Pharmacy.       Scott Camacho, PharmD

## 2024-12-11 LAB
ANION GAP SERPL CALC-SCNC: 13 MMOL/L (ref 10–20)
BUN SERPL-MCNC: 18 MG/DL (ref 6–23)
CALCIUM SERPL-MCNC: 8.7 MG/DL (ref 8.6–10.3)
CHLORIDE SERPL-SCNC: 97 MMOL/L (ref 98–107)
CO2 SERPL-SCNC: 24 MMOL/L (ref 21–32)
CREAT SERPL-MCNC: 1.6 MG/DL (ref 0.5–1.3)
CRP SERPL-MCNC: 4.94 MG/DL
EGFRCR SERPLBLD CKD-EPI 2021: 51 ML/MIN/1.73M*2
ERYTHROCYTE [DISTWIDTH] IN BLOOD BY AUTOMATED COUNT: 12.5 % (ref 11.5–14.5)
ERYTHROCYTE [SEDIMENTATION RATE] IN BLOOD BY WESTERGREN METHOD: 41 MM/H (ref 0–20)
EST. AVERAGE GLUCOSE BLD GHB EST-MCNC: 111 MG/DL
GLUCOSE SERPL-MCNC: 140 MG/DL (ref 74–99)
HAV IGM SER QL: NONREACTIVE
HBA1C MFR BLD: 5.5 %
HBV CORE IGM SER QL: NONREACTIVE
HBV SURFACE AG SERPL QL IA: NONREACTIVE
HCT VFR BLD AUTO: 31.9 % (ref 41–52)
HCV AB SER QL: NONREACTIVE
HGB BLD-MCNC: 11 G/DL (ref 13.5–17.5)
HIV 1+2 AB+HIV1 P24 AG SERPL QL IA: NONREACTIVE
MAGNESIUM SERPL-MCNC: 1.67 MG/DL (ref 1.6–2.4)
MCH RBC QN AUTO: 30.9 PG (ref 26–34)
MCHC RBC AUTO-ENTMCNC: 34.5 G/DL (ref 32–36)
MCV RBC AUTO: 90 FL (ref 80–100)
NRBC BLD-RTO: 0 /100 WBCS (ref 0–0)
PLATELET # BLD AUTO: 320 X10*3/UL (ref 150–450)
POTASSIUM SERPL-SCNC: 4.2 MMOL/L (ref 3.5–5.3)
RBC # BLD AUTO: 3.56 X10*6/UL (ref 4.5–5.9)
SODIUM SERPL-SCNC: 130 MMOL/L (ref 136–145)
VANCOMYCIN SERPL-MCNC: 8.9 UG/ML (ref 5–20)
WBC # BLD AUTO: 6.9 X10*3/UL (ref 4.4–11.3)

## 2024-12-11 PROCEDURE — 40800 DRAINAGE OF MOUTH LESION: CPT | Performed by: STUDENT IN AN ORGANIZED HEALTH CARE EDUCATION/TRAINING PROGRAM

## 2024-12-11 PROCEDURE — 2500000004 HC RX 250 GENERAL PHARMACY W/ HCPCS (ALT 636 FOR OP/ED): Performed by: INTERNAL MEDICINE

## 2024-12-11 PROCEDURE — 1100000001 HC PRIVATE ROOM DAILY

## 2024-12-11 PROCEDURE — 2500000004 HC RX 250 GENERAL PHARMACY W/ HCPCS (ALT 636 FOR OP/ED)

## 2024-12-11 PROCEDURE — 80048 BASIC METABOLIC PNL TOTAL CA: CPT

## 2024-12-11 PROCEDURE — 85652 RBC SED RATE AUTOMATED: CPT

## 2024-12-11 PROCEDURE — 2500000002 HC RX 250 W HCPCS SELF ADMINISTERED DRUGS (ALT 637 FOR MEDICARE OP, ALT 636 FOR OP/ED)

## 2024-12-11 PROCEDURE — 80074 ACUTE HEPATITIS PANEL: CPT | Mod: PARLAB

## 2024-12-11 PROCEDURE — 85027 COMPLETE CBC AUTOMATED: CPT

## 2024-12-11 PROCEDURE — 87389 HIV-1 AG W/HIV-1&-2 AB AG IA: CPT | Mod: PARLAB

## 2024-12-11 PROCEDURE — 2500000001 HC RX 250 WO HCPCS SELF ADMINISTERED DRUGS (ALT 637 FOR MEDICARE OP)

## 2024-12-11 PROCEDURE — 36415 COLL VENOUS BLD VENIPUNCTURE: CPT

## 2024-12-11 PROCEDURE — 86140 C-REACTIVE PROTEIN: CPT

## 2024-12-11 PROCEDURE — 83735 ASSAY OF MAGNESIUM: CPT

## 2024-12-11 PROCEDURE — 87081 CULTURE SCREEN ONLY: CPT | Mod: PARLAB

## 2024-12-11 PROCEDURE — 87077 CULTURE AEROBIC IDENTIFY: CPT | Mod: PARLAB | Performed by: STUDENT IN AN ORGANIZED HEALTH CARE EDUCATION/TRAINING PROGRAM

## 2024-12-11 PROCEDURE — 0C903ZZ DRAINAGE OF UPPER LIP, PERCUTANEOUS APPROACH: ICD-10-PCS | Performed by: STUDENT IN AN ORGANIZED HEALTH CARE EDUCATION/TRAINING PROGRAM

## 2024-12-11 PROCEDURE — 99254 IP/OBS CNSLTJ NEW/EST MOD 60: CPT | Performed by: STUDENT IN AN ORGANIZED HEALTH CARE EDUCATION/TRAINING PROGRAM

## 2024-12-11 PROCEDURE — 99233 SBSQ HOSP IP/OBS HIGH 50: CPT

## 2024-12-11 PROCEDURE — 80202 ASSAY OF VANCOMYCIN: CPT

## 2024-12-11 RX ORDER — ACETAMINOPHEN 325 MG/1
650 TABLET ORAL EVERY 6 HOURS
Status: DISCONTINUED | OUTPATIENT
Start: 2024-12-11 | End: 2024-12-14 | Stop reason: HOSPADM

## 2024-12-11 RX ORDER — TRAMADOL HYDROCHLORIDE 50 MG/1
25 TABLET ORAL EVERY 8 HOURS PRN
Status: DISCONTINUED | OUTPATIENT
Start: 2024-12-11 | End: 2024-12-14 | Stop reason: HOSPADM

## 2024-12-11 RX ORDER — HEPARIN SODIUM 5000 [USP'U]/ML
5000 INJECTION, SOLUTION INTRAVENOUS; SUBCUTANEOUS EVERY 8 HOURS SCHEDULED
Status: DISCONTINUED | OUTPATIENT
Start: 2024-12-11 | End: 2024-12-14 | Stop reason: HOSPADM

## 2024-12-11 ASSESSMENT — PAIN - FUNCTIONAL ASSESSMENT
PAIN_FUNCTIONAL_ASSESSMENT: 0-10

## 2024-12-11 ASSESSMENT — PAIN DESCRIPTION - LOCATION: LOCATION: MOUTH

## 2024-12-11 ASSESSMENT — COGNITIVE AND FUNCTIONAL STATUS - GENERAL
DAILY ACTIVITIY SCORE: 24
MOBILITY SCORE: 24

## 2024-12-11 ASSESSMENT — PAIN SCALES - GENERAL
PAINLEVEL_OUTOF10: 8
PAINLEVEL_OUTOF10: 4
PAINLEVEL_OUTOF10: 4

## 2024-12-11 NOTE — PROGRESS NOTES
"Valente Viera is a 54 y.o. male on day 0 of admission presenting with Cellulitis of face.    SUBJECTIVE    Reports pain to be 8-9/10, however does admit that swelling has gone down a little bit.  He does complain of more pocket filled pus across his upper lip.    OBJECTIVE    Physical Exam:  General:  Pleasant and cooperative. No apparent distress.  HEENT: Upper lip severely swollen, appears slightly pus filled  Chest:  Clear to auscultation. Bilateral and appropriate chest rise  CV:  Regular rate and rhythm.    Abdomen: Abdomen is soft, non-tender, non-distended. BS +   Extremities:  No lower extremity edema or cyanosis.   Neurological:  Conversing and answering questions appropriately   Skin:  Warm and dry.      Last Recorded Vitals  Blood pressure 100/60, pulse 64, temperature 36.6 °C (97.9 °F), resp. rate 16, height 1.727 m (5' 8\"), weight 72.6 kg (160 lb), SpO2 96%.  Intake/Output last 3 Shifts:  I/O last 3 completed shifts:  In: 300 (4.1 mL/kg) [IV Piggyback:300]  Out: - (0 mL/kg)   Weight: 72.6 kg     Last CBC & BMP  Lab Results   Component Value Date    GLUCOSE 140 (H) 12/11/2024    CALCIUM 8.7 12/11/2024     (L) 12/11/2024    K 4.2 12/11/2024    CO2 24 12/11/2024    CL 97 (L) 12/11/2024    BUN 18 12/11/2024    CREATININE 1.60 (H) 12/11/2024     Lab Results   Component Value Date    WBC 6.9 12/11/2024    HGB 11.0 (L) 12/11/2024    HCT 31.9 (L) 12/11/2024    MCV 90 12/11/2024     12/11/2024       ASSESSMENT & PLAN    Valente Viera is a 54 y.o. male presenting to [facility] on 12/10 for significant upper lip swelling.     #Upper lip/L sided face cellulitis of unknown origin  #RAMIRO  -Failed outpatient doxycycline ABX  -CT findings unremarkable for abscess, however concern for cellulitis extending to left upper cheek.  Mild leukocytosis  -Vancomycin/Zosyn 12/10-  -Bolus fluids, will discontinue Toradol for pain control.  Scheduled Tylenol/tramadol prn  -ID consult pending      #Chronic " opioid/heroin abuse-suboxone.  Prior overdose admissions  #Anxiety/depression-Doxepin, Seroquel   #Chronic left knee pain     Inpatient Checklist  Code Status: Full  DVT prophylaxis: HSQ  Diet: Regular  Disposition: Anai Matthews, DO  PGY-2, Internal Medicine  Please SecureChat for any further questions  This is a preliminary note, please await attending attestation for final A/P

## 2024-12-11 NOTE — CONSULTS
Dietitian consult ordered per nursing screen for stage 3 or 4 pressure injuries or multiple non healing wounds. Per review of EMR and secure chat with Mauricio RN,pt only has cellulitis to lip. Full nutrition assessment not indicated at this time. Pt to be re-screened for LOS day 10 if still admitted. Please re-consult RD for changes in nutrition status or need for RD sooner than anticipated re-screening.

## 2024-12-11 NOTE — CONSULTS
Reason For Consult  Lip abscess    History Of Present Illness  Valente Viera is a 54 y.o. male presenting with swelling of the upper lip.    Patient reports that he has been in a rehab facility but over the last week has noticed slowly worsening swelling of the upper lip. Thought it was a pimple or ingrown hair. Has gotten worse and he has developed severe pain that could be managed somewhat with ibuprofen. However, his pain became so intense that he eventually demanded to be released so he could present to the ED. He was initially prescribed an antibiotic only recently. Presented to the ED 12/10/2024 and imaging concerning for cellulitis vs developing phlegmon of the upper lip.  He currently notes quite severe pain that is not being managed well due to inability to use ibuprofen from RAMIRO.     Past Medical History  He has a past medical history of Opiate abuse, continuous (Multi).    Surgical History  He has no past surgical history on file.     Social History  He reports that he has been smoking cigarettes. He has never used smokeless tobacco. He reports current alcohol use. No history on file for drug use.    Family History  No family history on file.     Allergies  Patient has no known allergies.    Review of Systems  Negative except as noted above an in H&P.     Physical Exam  CONSTITUTIONAL: Well appearing male who appears stated age.  PSYCHIATRIC: Alert, appropriate mood and affect.  RESPIRATORY: Normal inspiration and expiration and chest wall expansion; no use of accessory muscles to breathe.  VOICE: Clear speech without hoarseness. No stridor nor stertor.  HEAD, FACE, AND SKIN: Significant edema of the upper lip with overlying crusting along the inferior philtrum at its junction with the squamous/mucosal lip. There is exquisite tenderness to manipulation. There is purulence draining from an area along the left lower lip; this appears to be at the junction of the squamous and mucosal lip.  DENTITION:  "Edentulous.    Last Recorded Vitals  Blood pressure 104/62, pulse 63, temperature 36.6 °C (97.9 °F), temperature source Temporal, resp. rate 16, height 1.727 m (5' 8\"), weight 72.6 kg (160 lb), SpO2 97%.    Relevant Results  CT max-fac 12/10/2024:  I personally viewed the patient's imaging from his emergency stay.  Unfortunately, this was performed without contrast.  There is significant swelling of the upper lip with fat stranding tracking into the left cheek.  It is not clear whether abscess is present.    ----------------------------------------------  Procedure: Incision and Drainage, lip  Indication: Lip abscess  Informed consent verbally obtained: The risks, benefits, alternatives, and expectations were discussed with the patient, who wished to proceed  Anesthesia: Subcutaneous 1% lidocaine with 1:100,000 epinephrine    The risks and benefits of the procedure were explained to the patient and consent was verbally obtained. Local anesthetic was injected at the site of concern. After an appropriate amount of time an 11-blade was used to widen a present drainage whole along the junction of the mucosal and squamous lip. Thick purulence was expressed and cultured.  Curved hemostats were used to open further abscess pockets with return of blood and further purulence.    The patient tolerated the procedure well and there were no immediate complications.  ---------------------------------------------------    Assessment/Plan     54 y.o. male admitted with roughly a week of worsening upper lip cellulitis now with upper lip abscess.  Patient had draining pus noted on exam.  After discussion with the patient I recommended incision and drainage which is successfully performed.  A culture of the purulence was sent.  I recommended the patient clean the lip on a regular basis.  We will want to irrigate the wound bed on a daily basis.    Care discussed with medicine team.    Ethan Sanders MD    "

## 2024-12-11 NOTE — CARE PLAN
The patient's goals for the shift include  pain control    The clinical goals for the shift include  safety and comfort      Problem: Pain - Adult  Goal: Verbalizes/displays adequate comfort level or baseline comfort level  Outcome: Progressing     Problem: Safety - Adult  Goal: Free from fall injury  Outcome: Progressing

## 2024-12-11 NOTE — CONSULTS
Consults    Reason for Consult:  Upper lip cellulitis    Subjective   Patient is a 54 y.o. male admitted on 12/10/2024  2:02 PM with chief complaint of upper clip swelling.     HPI:    Valente Viera is a 54-year-old male with past medical history of opioid use disorder on Suboxone, anxiety, depression who presented to [facility] 12/10/2024 with upper lip swelling.  Patient last used opioids 10/17/2024 is currently in his 60-day drug rehab program.  Patient states over the last few days he has had pain 9 out of 10 and fullness in his upper lip.  Patient denies any fevers, chills, chest pain, shortness of breath.  Patient states he has been on doxycycline since this Monday without improvement.  Patient currently on Vanco and Zosyn. He states that he noticed the swelling last Thursday. He states that he let a roommate borrow his clippers last week and when the clipper were given back there was dried blood on it. Patient denies recent sexual activity. He states that he has not used IV needles in the past. STI screening with Hep C negative from 11/1/2024. Infectious disease consulted for cellulitis of upper lip.    Past Medical History:  Past Medical History:   Diagnosis Date    Opiate abuse, continuous (Multi)        Past Surgical History:  History reviewed. No pertinent surgical history.     Family History:  No family history on file.     Social History:   reports that he has been smoking cigarettes. He has never used smokeless tobacco. He reports current alcohol use. No history on file for drug use.    Review of Systems:   Review of Systems   A 10+ point ROS was completed and otherwise negative except as noted above and per HPI.    Objective   Vitals:    12/11/24 0729   BP: 100/60   Pulse: 64   Resp:    Temp: 36.6 °C (97.9 °F)   SpO2: 96%      Physical Exam  Physical Exam:  Constitutional: well developed, awake, alert, no acute distress  ENMT: mucous membranes moist, EOMI, conjunctivae clear  Head/Neck:  normocephalic, atraumatic; supple, trachea midline  Respiratory/Thorax: patent airways, CTAB; no wheezes, rales, or rhonchi  Cardiovascular: RRR, no murmur  Gastrointestinal: soft, nondistended, non-tender, bowel sounds appreciated  Extremities: palpable peripheral pulses, no edema or cyanosis  Neurological: AO x3, no focal deficits  Psychological: appropriate mood and behavior  Skin: warm and dry    Assessment/Plan   Antibiotics  -Vanc day 1  -Zosyn day 1    Patient is a 54-year-old male with past medical history of substance abuse as well as anxiety depression who presents with upper lip swelling.    1.  Facial cellulitis, upper lip    -Continue antibiotics at this time. Ordered MRSA nares. As patient cut himself using an electric razor that that blood from a roommate on it - will order HIV and Hepatitis panels.  -ENT was consulted to drain lip abscess.  -Will continue to follow    I reviewed and interpreted all lab test imaging studies and documentations from other healthcare providers.  I am monitoring for antibiotic side effects and toxicity.  This is a preliminary note written by the resident. Please wait for attending addendum for finalization of note and recommendations.    Ethan Whaley DO, PhD  Internal Medicine PGY2

## 2024-12-11 NOTE — CARE PLAN
The patient's goals for the shift include      The clinical goals for the shift include pain control and antibiotics    Problem: Pain - Adult  Goal: Verbalizes/displays adequate comfort level or baseline comfort level  Outcome: Progressing     Problem: Safety - Adult  Goal: Free from fall injury  Outcome: Progressing     Problem: Discharge Planning  Goal: Discharge to home or other facility with appropriate resources  Outcome: Progressing     Problem: Chronic Conditions and Co-morbidities  Goal: Patient's chronic conditions and co-morbidity symptoms are monitored and maintained or improved  Outcome: Progressing     Problem: Fall/Injury  Goal: Not fall by end of shift  Outcome: Progressing  Goal: Be free from injury by end of the shift  Outcome: Progressing  Goal: Verbalize understanding of personal risk factors for fall in the hospital  Outcome: Progressing  Goal: Verbalize understanding of risk factor reduction measures to prevent injury from fall in the home  Outcome: Progressing  Goal: Use assistive devices by end of the shift  Outcome: Progressing  Goal: Pace activities to prevent fatigue by end of the shift  Outcome: Progressing     Problem: Pain  Goal: Takes deep breaths with improved pain control throughout the shift  Outcome: Progressing  Goal: Turns in bed with improved pain control throughout the shift  Outcome: Progressing  Goal: Walks with improved pain control throughout the shift  Outcome: Progressing  Goal: Performs ADL's with improved pain control throughout shift  Outcome: Progressing  Goal: Participates in PT with improved pain control throughout the shift  Outcome: Progressing  Goal: Free from opioid side effects throughout the shift  Outcome: Progressing  Goal: Free from acute confusion related to pain meds throughout the shift  Outcome: Progressing

## 2024-12-11 NOTE — PROGRESS NOTES
12/11/24 1146   Discharge Planning   Living Arrangements Parent   Support Systems Parent   Assistance Needed independent   Type of Residence Private residence   Number of Stairs to Enter Residence 6   Number of Stairs Within Residence 12   Do you have animals or pets at home? No   Who is requesting discharge planning? Provider   Home or Post Acute Services None   Expected Discharge Disposition Home   Does the patient need discharge transport arranged? No   Intensity of Service   Intensity of Service 0-30 min     Met with patient at bedside. Introduced self and role as care coordinator. Demographics verified. Patient PCP is [facility]. Patient insurance is [employer]. Patient is independent with ADL's. Patient denies any home going needs at this time.

## 2024-12-11 NOTE — PROGRESS NOTES
Vancomycin Dosing by Pharmacy- FOLLOW UP    Valente Viera is a 54 y.o. year old male who Pharmacy has been consulted for vancomycin dosing for cellulitis, skin and soft tissue. Based on the patient's indication and renal status this patient is being dosed based on a goal AUC of 400-600.     Renal function is currently declining.    Current vancomycin dose: 1250 mg given every 24 hours    Estimated vancomycin AUC on current dose: 543 mg/L.hr     Visit Vitals  /60   Pulse 64   Temp 36.6 °C (97.9 °F)   Resp 16        Lab Results   Component Value Date    CREATININE 1.60 (H) 2024    CREATININE 1.28 12/10/2024        Patient weight is as follows:   Vitals:    12/10/24 2152   Weight: 72.6 kg (160 lb)       Cultures:  No results found for the encounter in last 14 days.       I/O last 3 completed shifts:  In: 300 (4.1 mL/kg) [IV Piggyback:300]  Out: - (0 mL/kg)   Weight: 72.6 kg   I/O during current shift:  No intake/output data recorded.    Temp (24hrs), Av.5 °C (97.7 °F), Min:36.3 °C (97.3 °F), Max:36.6 °C (97.9 °F)      Assessment/Plan    Within goal AUC range. Continue current vancomycin regimen. Will need close follow up on potential RAMIRO d/t Scr bump from 1.28 mg/dL to 1.60 mg/dL    This dosing regimen is predicted by [employer] to result in the following pharmacokinetic parameters:  Regimen: 1250 mg IV every 24 hours.  Start time: 17:17 on 2024  Exposure target: AUC24 (range)400-600 mg/L.hr   [id number]: 480 mg/L.hr  AUC24,ss: 543 mg/L.hr  Probability of AUC24 > 400: 96 %  Ctrough,ss: 15.6 mg/L  Probability of Ctrough,ss > 20: 17 %    The next level will be obtained on  at AM labs. May be obtained sooner if clinically indicated.   Will continue to monitor renal function daily while on vancomycin and order serum creatinine at least every 48 hours if not already ordered.  Follow for continued vancomycin needs, clinical response, and signs/symptoms of toxicity.       Braeden Zamarripa,  RPh

## 2024-12-12 LAB
ANION GAP SERPL CALC-SCNC: 11 MMOL/L (ref 10–20)
BUN SERPL-MCNC: 9 MG/DL (ref 6–23)
CALCIUM SERPL-MCNC: 9.2 MG/DL (ref 8.6–10.3)
CHLORIDE SERPL-SCNC: 97 MMOL/L (ref 98–107)
CO2 SERPL-SCNC: 27 MMOL/L (ref 21–32)
CREAT SERPL-MCNC: 1.31 MG/DL (ref 0.5–1.3)
EGFRCR SERPLBLD CKD-EPI 2021: 65 ML/MIN/1.73M*2
ERYTHROCYTE [DISTWIDTH] IN BLOOD BY AUTOMATED COUNT: 12.5 % (ref 11.5–14.5)
GLUCOSE SERPL-MCNC: 156 MG/DL (ref 74–99)
HCT VFR BLD AUTO: 34.4 % (ref 41–52)
HGB BLD-MCNC: 11.9 G/DL (ref 13.5–17.5)
MCH RBC QN AUTO: 30.8 PG (ref 26–34)
MCHC RBC AUTO-ENTMCNC: 34.6 G/DL (ref 32–36)
MCV RBC AUTO: 89 FL (ref 80–100)
NRBC BLD-RTO: 0 /100 WBCS (ref 0–0)
PLATELET # BLD AUTO: 350 X10*3/UL (ref 150–450)
POTASSIUM SERPL-SCNC: 4.6 MMOL/L (ref 3.5–5.3)
RBC # BLD AUTO: 3.86 X10*6/UL (ref 4.5–5.9)
SODIUM SERPL-SCNC: 130 MMOL/L (ref 136–145)
WBC # BLD AUTO: 5.3 X10*3/UL (ref 4.4–11.3)

## 2024-12-12 PROCEDURE — 36415 COLL VENOUS BLD VENIPUNCTURE: CPT

## 2024-12-12 PROCEDURE — 80048 BASIC METABOLIC PNL TOTAL CA: CPT

## 2024-12-12 PROCEDURE — 2500000002 HC RX 250 W HCPCS SELF ADMINISTERED DRUGS (ALT 637 FOR MEDICARE OP, ALT 636 FOR OP/ED)

## 2024-12-12 PROCEDURE — 1100000001 HC PRIVATE ROOM DAILY

## 2024-12-12 PROCEDURE — 99233 SBSQ HOSP IP/OBS HIGH 50: CPT

## 2024-12-12 PROCEDURE — 2500000001 HC RX 250 WO HCPCS SELF ADMINISTERED DRUGS (ALT 637 FOR MEDICARE OP)

## 2024-12-12 PROCEDURE — 2500000004 HC RX 250 GENERAL PHARMACY W/ HCPCS (ALT 636 FOR OP/ED)

## 2024-12-12 PROCEDURE — 85027 COMPLETE CBC AUTOMATED: CPT

## 2024-12-12 RX ORDER — DEXAMETHASONE SODIUM PHOSPHATE 1 MG/ML
2 SOLUTION/ DROPS OPHTHALMIC
Status: DISCONTINUED | OUTPATIENT
Start: 2024-12-12 | End: 2024-12-12

## 2024-12-12 RX ORDER — DEXAMETHASONE SODIUM PHOSPHATE 1 MG/ML
2 SOLUTION/ DROPS OPHTHALMIC EVERY 6 HOURS PRN
Status: DISCONTINUED | OUTPATIENT
Start: 2024-12-12 | End: 2024-12-14 | Stop reason: HOSPADM

## 2024-12-12 ASSESSMENT — COGNITIVE AND FUNCTIONAL STATUS - GENERAL
MOBILITY SCORE: 24
DAILY ACTIVITIY SCORE: 24
MOBILITY SCORE: 24
DAILY ACTIVITIY SCORE: 24

## 2024-12-12 ASSESSMENT — PAIN SCALES - GENERAL
PAINLEVEL_OUTOF10: 0 - NO PAIN
PAINLEVEL_OUTOF10: 2

## 2024-12-12 NOTE — PROGRESS NOTES
"Valente Viera is a 54 y.o. male on day 1 of admission presenting with Cellulitis of face.    SUBJECTIVE    Pain is a 5/10, much more improved swelling after I&D yesterday.  Tolerating oral intake, finished breakfast.    OBJECTIVE    Physical Exam:  General:  Pleasant and cooperative. No apparent distress.  HEENT: Upper lip significantly less swollen, appears slightly pus filled  Chest:  Clear to auscultation. Bilateral and appropriate chest rise  CV:  Regular rate and rhythm.    Abdomen: Abdomen is soft, non-tender, non-distended. BS +   Extremities:  No lower extremity edema or cyanosis.   Neurological:  Conversing and answering questions appropriately   Skin:  Warm and dry.      Last Recorded Vitals  Blood pressure 110/67, pulse 66, temperature 36.5 °C (97.7 °F), temperature source Temporal, resp. rate 18, height 1.727 m (5' 8\"), weight 72.6 kg (160 lb), SpO2 95%.  Intake/Output last 3 Shifts:  I/O last 3 completed shifts:  In: 1150 (15.8 mL/kg) [IV Piggyback:1150]  Out: - (0 mL/kg)   Weight: 72.6 kg     Last CBC & BMP  Lab Results   Component Value Date    GLUCOSE 156 (H) 12/12/2024    CALCIUM 9.2 12/12/2024     (L) 12/12/2024    K 4.6 12/12/2024    CO2 27 12/12/2024    CL 97 (L) 12/12/2024    BUN 9 12/12/2024    CREATININE 1.31 (H) 12/12/2024     Lab Results   Component Value Date    WBC 5.3 12/12/2024    HGB 11.9 (L) 12/12/2024    HCT 34.4 (L) 12/12/2024    MCV 89 12/12/2024     12/12/2024       ASSESSMENT & PLAN    Valente Viera is a 54 y.o. male presenting to [facility] on 12/10 for significant upper lip swelling.     #Upper lip/L sided face cellulitis 2/2 sharing razor blade  #RAMIRO, improving  -Failed outpatient doxycycline ABX  -CT findings unremarkable for abscess, however concern for cellulitis extending to left upper cheek.  Mild leukocytosis  -Vancomycin/Zosyn 12/10-  -Scheduled Tylenol/tramadol prn  -ID following, cultures pending. HIV/Hep B unremarkable.     #Chronic opioid/heroin " abuse-suboxone.  Prior overdose admissions  #Anxiety/depression-Doxepin, Seroquel   #Chronic left knee pain     Inpatient Checklist  Code Status: Full  DVT prophylaxis: HSQ  Diet: Regular  Disposition: Anai Matthews, DO  PGY-2, Internal Medicine  Please SecureChat for any further questions  This is a preliminary note, please await attending attestation for final A/P

## 2024-12-12 NOTE — PROGRESS NOTES
"Valente Viera is a 54 y.o. male on day 1 of admission presenting with Cellulitis of face. No acute events overnight    Subjective   He reports that pain has significantly improved. Had a fair amount of drainage overnight. Cleaned quite a bit of crusting off the lip. Having new drainage from the front of the lip.       Objective     Physical Exam  CONSTITUTIONAL: Well appearing male who appears stated age.  PSYCHIATRIC: Alert, appropriate mood and affect.  RESPIRATORY: Normal inspiration and expiration and chest wall expansion; no use of accessory muscles to breathe.  VOICE: Clear speech without hoarseness. No stridor nor stertor.  HEAD, FACE, AND SKIN: Improved edema of the upper lip with overlying crusting along the anterior lip with some purulent drainage from pinpoint site, there is exquisite tenderness to manipulation. There is purulence draining from incision site at the junction of the squamous and mucosal lip. Irrigated with saline with clear drainage - minimal purulence.  DENTITION: Edentulous.    Last Recorded Vitals  Blood pressure 122/80, pulse 66, temperature 36.7 °C (98.1 °F), resp. rate 18, height 1.727 m (5' 8\"), weight 72.6 kg (160 lb), SpO2 98%.  Intake/Output last 3 Shifts:  I/O last 3 completed shifts:  In: 1150 (15.8 mL/kg) [IV Piggyback:1150]  Out: - (0 mL/kg)   Weight: 72.6 kg     Relevant Results        Scheduled medications  acetaminophen, 650 mg, oral, q6h  buprenorphine, 8 mg, sublingual, Nightly  buprenorphine, 4 mg, sublingual, Daily  doxepin, 50 mg, oral, Nightly  heparin (porcine), 5,000 Units, subcutaneous, q8h ALYSA  piperacillin-tazobactam, 3.375 g, intravenous, q6h  psyllium, 1 packet, oral, Daily  QUEtiapine, 100 mg, oral, Nightly  vancomycin, 1,250 mg, intravenous, q24h      Continuous medications     PRN medications  PRN medications: traMADol, vancomycin       LABS  Gram Stain  Abnormal   (2+) Few Polymorphonuclear leukocytes   (1+) Rare Gram positive cocci       "     Assessment/Plan   Assessment & Plan  Cellulitis of face      54 y.o. male admitted with roughly a week of worsening upper lip cellulitis now with upper lip abscess.  Patient had draining pus noted on exam.  He is now s/p bedside I&D with improvement in pain; culture growing G+ cocci.  Will continue to irrigate daily. Continue antibiotics per ID/medicine.      Ethan Sanders MD

## 2024-12-12 NOTE — PROGRESS NOTES
Subjective   Valente Viera is a 54 y.o. male who presents with Abscess and Oral Swelling.    Patient states that his upper lip feels better since being drained.    Objective   Vitals:    12/12/24 0738   BP: 110/67   Pulse: 66   Resp: 18   Temp: 36.5 °C (97.7 °F)   SpO2: 95%      Physical Exam  Physical Exam:  Constitutional: well developed, awake, alert, no acute distress  ENMT: mucous membranes moist, EOMI, conjunctivae clear  Head/Neck: normocephalic, atraumatic; supple, trachea midline  Respiratory/Thorax: patent airways, CTAB; no wheezes, rales, or rhonchi  Cardiovascular: RRR, no murmur  Gastrointestinal: soft, nondistended, non-tender, bowel sounds appreciated  Extremities: palpable peripheral pulses, no edema or cyanosis  Neurological: AO x3, no focal deficits  Psychological: appropriate mood and behavior  Skin: warm and dry    Assessment/Plan     Antibiotics  -Vanc day 2  -Zosyn day 2     1.  Facial cellulitis, upper lip     -ENT was able to duglas upper lip abscess.  On culture.  Culture pending at this time.  Continue Vanco and Zosyn at this time.  -HIV and hepatitis panel negative at this time.  Patient will need repeat HIV and hepatitis panel in 6 weeks.     I reviewed and interpreted all lab test imaging studies and documentations from other healthcare providers.  I am monitoring for antibiotic side effects and toxicity.  This is a preliminary note written by the resident. Please wait for attending addendum for finalization of note and recommendations.     Ethan Whaley DO, PhD  Internal Medicine PGY2

## 2024-12-12 NOTE — CARE PLAN
Problem: Pain - Adult  Goal: Verbalizes/displays adequate comfort level or baseline comfort level  Outcome: Progressing     Problem: Safety - Adult  Goal: Free from fall injury  Outcome: Progressing     Problem: Discharge Planning  Goal: Discharge to home or other facility with appropriate resources  Outcome: Progressing     Problem: Chronic Conditions and Co-morbidities  Goal: Patient's chronic conditions and co-morbidity symptoms are monitored and maintained or improved  Outcome: Progressing     Problem: Fall/Injury  Goal: Not fall by end of shift  Outcome: Progressing  Goal: Be free from injury by end of the shift  Outcome: Progressing  Goal: Verbalize understanding of personal risk factors for fall in the hospital  Outcome: Progressing  Goal: Verbalize understanding of risk factor reduction measures to prevent injury from fall in the home  Outcome: Progressing  Goal: Use assistive devices by end of the shift  Outcome: Progressing  Goal: Pace activities to prevent fatigue by end of the shift  Outcome: Progressing     Problem: Pain  Goal: Takes deep breaths with improved pain control throughout the shift  Outcome: Progressing  Goal: Turns in bed with improved pain control throughout the shift  Outcome: Progressing  Goal: Walks with improved pain control throughout the shift  Outcome: Progressing  Goal: Performs ADL's with improved pain control throughout shift  Outcome: Progressing  Goal: Participates in PT with improved pain control throughout the shift  Outcome: Progressing  Goal: Free from opioid side effects throughout the shift  Outcome: Progressing  Goal: Free from acute confusion related to pain meds throughout the shift  Outcome: Progressing   The patient's goals for the shift include      The clinical goals for the shift include pain management

## 2024-12-12 NOTE — CARE PLAN
Patient incision site irrigated with tea-colored betadine/saline. Pain improved from this morning.

## 2024-12-13 LAB
ANION GAP SERPL CALC-SCNC: 10 MMOL/L (ref 10–20)
BUN SERPL-MCNC: 7 MG/DL (ref 6–23)
CALCIUM SERPL-MCNC: 8.6 MG/DL (ref 8.6–10.3)
CHLORIDE SERPL-SCNC: 99 MMOL/L (ref 98–107)
CO2 SERPL-SCNC: 26 MMOL/L (ref 21–32)
CREAT SERPL-MCNC: 1.22 MG/DL (ref 0.5–1.3)
EGFRCR SERPLBLD CKD-EPI 2021: 71 ML/MIN/1.73M*2
ERYTHROCYTE [DISTWIDTH] IN BLOOD BY AUTOMATED COUNT: 12.1 % (ref 11.5–14.5)
GLUCOSE SERPL-MCNC: 85 MG/DL (ref 74–99)
HCT VFR BLD AUTO: 30.2 % (ref 41–52)
HGB BLD-MCNC: 10.6 G/DL (ref 13.5–17.5)
MAGNESIUM SERPL-MCNC: 1.91 MG/DL (ref 1.6–2.4)
MCH RBC QN AUTO: 30.7 PG (ref 26–34)
MCHC RBC AUTO-ENTMCNC: 35.1 G/DL (ref 32–36)
MCV RBC AUTO: 88 FL (ref 80–100)
NRBC BLD-RTO: 0 /100 WBCS (ref 0–0)
PLATELET # BLD AUTO: 324 X10*3/UL (ref 150–450)
POTASSIUM SERPL-SCNC: 4.2 MMOL/L (ref 3.5–5.3)
RBC # BLD AUTO: 3.45 X10*6/UL (ref 4.5–5.9)
SODIUM SERPL-SCNC: 131 MMOL/L (ref 136–145)
STAPHYLOCOCCUS SPEC CULT: ABNORMAL
VANCOMYCIN SERPL-MCNC: 11.7 UG/ML (ref 5–20)
WBC # BLD AUTO: 3.6 X10*3/UL (ref 4.4–11.3)

## 2024-12-13 PROCEDURE — 80202 ASSAY OF VANCOMYCIN: CPT

## 2024-12-13 PROCEDURE — 36415 COLL VENOUS BLD VENIPUNCTURE: CPT

## 2024-12-13 PROCEDURE — 80048 BASIC METABOLIC PNL TOTAL CA: CPT

## 2024-12-13 PROCEDURE — 2500000001 HC RX 250 WO HCPCS SELF ADMINISTERED DRUGS (ALT 637 FOR MEDICARE OP)

## 2024-12-13 PROCEDURE — 83735 ASSAY OF MAGNESIUM: CPT

## 2024-12-13 PROCEDURE — 85027 COMPLETE CBC AUTOMATED: CPT

## 2024-12-13 PROCEDURE — 2500000002 HC RX 250 W HCPCS SELF ADMINISTERED DRUGS (ALT 637 FOR MEDICARE OP, ALT 636 FOR OP/ED)

## 2024-12-13 PROCEDURE — 99232 SBSQ HOSP IP/OBS MODERATE 35: CPT

## 2024-12-13 PROCEDURE — 1100000001 HC PRIVATE ROOM DAILY

## 2024-12-13 PROCEDURE — 2500000004 HC RX 250 GENERAL PHARMACY W/ HCPCS (ALT 636 FOR OP/ED)

## 2024-12-13 ASSESSMENT — COGNITIVE AND FUNCTIONAL STATUS - GENERAL
DAILY ACTIVITIY SCORE: 24
MOBILITY SCORE: 24

## 2024-12-13 ASSESSMENT — PAIN - FUNCTIONAL ASSESSMENT: PAIN_FUNCTIONAL_ASSESSMENT: 0-10

## 2024-12-13 ASSESSMENT — PAIN SCALES - GENERAL
PAINLEVEL_OUTOF10: 0 - NO PAIN
PAINLEVEL_OUTOF10: 0 - NO PAIN

## 2024-12-13 NOTE — PROGRESS NOTES
"Valente Viera is a 54 y.o. male on day 2 of admission presenting with Cellulitis of face.    No acute events overnight.    Subjective   He reports that pain has significantly improved; this morning around a 1. Continued to have drainage overnight. Having some persistnet drainage from the front of the lip.       Objective     Physical Exam  CONSTITUTIONAL: Well appearing male who appears stated age.  PSYCHIATRIC: Alert, appropriate mood and affect.  RESPIRATORY: Normal inspiration and expiration and chest wall expansion; no use of accessory muscles to breathe.  VOICE: Clear speech without hoarseness. No stridor nor stertor.  HEAD, FACE, AND SKIN: Much improved edema of the upper lip with overlying crusting/ulceration along the anterior lip without purulent drainage today, there is improved tenderness to manipulation. There is minimal purulence draining from incision site at the junction of the squamous and mucosal lip.  DENTITION: Edentulous.    Last Recorded Vitals  Blood pressure 101/59, pulse 58, temperature 36.7 °C (98.1 °F), resp. rate 18, height 1.727 m (5' 8\"), weight 72.6 kg (160 lb), SpO2 96%.  Intake/Output last 3 Shifts:  I/O last 3 completed shifts:  In: 450 (6.2 mL/kg) [IV Piggyback:450]  Out: - (0 mL/kg)   Weight: 72.6 kg     Relevant Results        Scheduled medications  acetaminophen, 650 mg, oral, q6h  buprenorphine, 8 mg, sublingual, Nightly  buprenorphine, 4 mg, sublingual, Daily  doxepin, 50 mg, oral, Nightly  heparin (porcine), 5,000 Units, subcutaneous, q8h ALYSA  piperacillin-tazobactam, 3.375 g, intravenous, q6h  psyllium, 1 packet, oral, Daily  QUEtiapine, 100 mg, oral, Nightly  vancomycin, 1,250 mg, intravenous, q24h      Continuous medications     PRN medications  PRN medications: dexAMETHasone, traMADol, vancomycin       LABS  Gram Stain  Abnormal   (2+) Few Polymorphonuclear leukocytes   (1+) Rare Gram positive cocci           Assessment/Plan   Assessment & Plan  Cellulitis of " face      54 y.o. male admitted with roughly a week of worsening upper lip cellulitis now with upper lip abscess.  Patient had draining pus noted on exam.  He is now s/p bedside I&D with significant improvement in pain; culture growing G+ cocci.  Lip edema now much improved and incision site appears to be healing. Continue antibiotics per ID/medicine.    Will continue to monitor wound; especially ulcerated site at anterior lip.    Ethan Sanders MD

## 2024-12-13 NOTE — PROGRESS NOTES
Sw met with pt to see if he needed any community resources for intensive outpatient addiction treatment centers. Pt was in the inpatient addiction treatment center with Mamadou Verma prior to coming to the hospital. Pt only had less than a week left until he was being DC from their program.   Pt will DC to the community post hospitalization. Pt resides with his family.  Pt states he is working on setting up counseling and intensive outpatient services for when he discharges with Mamadou Verma.  Sw offered to have Kerrie from [facility] see pt as well. Pt is agreeable. Sw called Kerrie and she will come and see pt.  Sw also provided pt with a packet of outpatient resources in the community.  Sw will continue to provide support and services as needed to ensure a safe dc plan is in place.

## 2024-12-13 NOTE — PROGRESS NOTES
"Valente Viera is a 54 y.o. male on day 2 of admission presenting with Cellulitis of face.    SUBJECTIVE  Pain is a 2 out of 10, swelling continues to improve.  Educated on the importance of not taking ibuprofen so frequently on discharge.  Educated on the importance of not sharing razors are using the same razor on different parts of the body.    OBJECTIVE    Physical Exam:  General:  Pleasant and cooperative. No apparent distress.  HEENT: Upper lip significantly less swollen  Chest:  Clear to auscultation. Bilateral and appropriate chest rise  CV:  Regular rate and rhythm.    Abdomen: Abdomen is soft, non-tender, non-distended. BS +   Extremities:  No lower extremity edema or cyanosis.   Neurological:  Conversing and answering questions appropriately   Skin:  Warm and dry.      Last Recorded Vitals  Blood pressure 111/71, pulse 66, temperature 36.7 °C (98.1 °F), temperature source Temporal, resp. rate 20, height 1.727 m (5' 8\"), weight 72.6 kg (160 lb), SpO2 99%.  Intake/Output last 3 Shifts:  I/O last 3 completed shifts:  In: 450 (6.2 mL/kg) [IV Piggyback:450]  Out: - (0 mL/kg)   Weight: 72.6 kg     Last CBC & BMP  Lab Results   Component Value Date    GLUCOSE 85 12/13/2024    CALCIUM 8.6 12/13/2024     (L) 12/13/2024    K 4.2 12/13/2024    CO2 26 12/13/2024    CL 99 12/13/2024    BUN 7 12/13/2024    CREATININE 1.22 12/13/2024     Lab Results   Component Value Date    WBC 3.6 (L) 12/13/2024    HGB 10.6 (L) 12/13/2024    HCT 30.2 (L) 12/13/2024    MCV 88 12/13/2024     12/13/2024       ASSESSMENT & PLAN    Valente Viera is a 54 y.o. male presenting to [facility] on 12/10 for significant upper lip swelling.     #Upper lip/L sided face cellulitis 2/2 sharing razor blade  #RAMIRO, resolved  -Failed outpatient doxycycline ABX  -CT findings unremarkable for abscess, however concern for cellulitis extending to left upper cheek  -Vancomycin 12/10-, pending sensitivities  -Scheduled Tylenol/tramadol prn  -ID " following. HIV/Hep B unremarkable.     #Chronic opioid/heroin abuse-suboxone.  Prior overdose admissions  #Anxiety/depression-Doxepin, Seroquel   #Chronic left knee pain     Inpatient Checklist  Code Status: Full  DVT prophylaxis: HSQ  Diet: Regular  Disposition: Anai Matthews, DO  PGY-2, Internal Medicine  Please SecureChat for any further questions  This is a preliminary note, please await attending attestation for final A/P

## 2024-12-13 NOTE — PROGRESS NOTES
Subjective   Valente Viera is a 54 y.o. male who presents with Abscess and Oral Swelling.    Patient states that his upper lip feels better since being drained.    Objective   Vitals:    12/13/24 0923   BP: 111/71   Pulse: 66   Resp: 20   Temp:    SpO2: 99%      Physical Exam  Physical Exam:  Constitutional: well developed, awake, alert, no acute distress  ENMT: mucous membranes moist, EOMI, conjunctivae clear  Head/Neck: normocephalic, atraumatic; supple, trachea midline  Respiratory/Thorax: patent airways, CTAB; no wheezes, rales, or rhonchi  Cardiovascular: RRR, no murmur  Gastrointestinal: soft, nondistended, non-tender, bowel sounds appreciated  Extremities: palpable peripheral pulses, no edema or cyanosis  Neurological: AO x3, no focal deficits  Psychological: appropriate mood and behavior  Skin: warm and dry    Assessment/Plan     Antibiotics  -Vanc day 3     1.  Facial cellulitis, upper lip     -Cultures growing gram positive cocci, waiting speciation and sensitivities.  Will discontinue Zosyn at this time and continue vancomycin.  -HIV and hepatitis panel negative at this time.  Patient will need repeat HIV and hepatitis panel in 6 weeks.     I reviewed and interpreted all lab test imaging studies and documentations from other healthcare providers.  I am monitoring for antibiotic side effects and toxicity.  This is a preliminary note written by the resident. Please wait for attending addendum for finalization of note and recommendations.     Ethan Whaley DO, PhD  Internal Medicine PGY2

## 2024-12-13 NOTE — PROGRESS NOTES
Vancomycin Dosing by Pharmacy- FOLLOW UP    Valente Viera is a 54 y.o. year old male who Pharmacy has been consulted for vancomycin dosing for cellulitis, skin and soft tissue. Based on the patient's indication and renal status this patient is being dosed based on a goal AUC of 400-600.     Renal function is currently improving.    Current vancomycin dose: 1250 mg given every 24 hours    Estimated vancomycin AUC on current dose: 405 mg/L.hr     Visit Vitals  /75 (BP Location: Right arm, Patient Position: Lying)   Pulse 60   Temp 36.7 °C (98.1 °F) (Temporal)   Resp 16        Lab Results   Component Value Date    CREATININE 1.22 12/13/2024    CREATININE 1.31 (H) 12/12/2024    CREATININE 1.60 (H) 12/11/2024    CREATININE 1.28 12/10/2024        No results found for the encounter in last 14 days.      I/O last 3 completed shifts:  In: 450 (6.2 mL/kg) [IV Piggyback:450]  Out: - (0 mL/kg)   Weight: 72.6 kg       Assessment/Plan    Day #4 of vancomycin therapy.   Predicted AUC is at the bottom of goal range, with a 53% chance that AUC is >400. Given improving renal function, will adjust regimen to 1500 mg q24h.     This dosing regimen is predicted by [employer] to result in the following pharmacokinetic parameters:  [id number]: 465 mg/L.hr  AUC24,ss: 482 mg/L.hr  Probability of AUC24 > 400: 90 %  Ctrough,ss: 10.7 mg/L  Probability of Ctrough,ss > 20: 2 %    The next level will be obtained on 12/15 with AM labs. May be obtained sooner if clinically indicated.   Will continue to monitor renal function daily while on vancomycin and order serum creatinine at least every 48 hours if not already ordered.  Will follow for continued vancomycin needs, clinical response, and signs/symptoms of toxicity.     Please call with any questions.  Ariana Portillo, PharmD, BCCCP  [phone]

## 2024-12-14 VITALS
DIASTOLIC BLOOD PRESSURE: 80 MMHG | HEIGHT: 68 IN | OXYGEN SATURATION: 97 % | BODY MASS INDEX: 24.25 KG/M2 | RESPIRATION RATE: 18 BRPM | HEART RATE: 67 BPM | WEIGHT: 160 LBS | SYSTOLIC BLOOD PRESSURE: 130 MMHG | TEMPERATURE: 98.6 F

## 2024-12-14 LAB
ANION GAP SERPL CALC-SCNC: 11 MMOL/L (ref 10–20)
BACTERIA SPEC CULT: ABNORMAL
BUN SERPL-MCNC: 9 MG/DL (ref 6–23)
CALCIUM SERPL-MCNC: 8.8 MG/DL (ref 8.6–10.3)
CHLORIDE SERPL-SCNC: 103 MMOL/L (ref 98–107)
CO2 SERPL-SCNC: 23 MMOL/L (ref 21–32)
CREAT SERPL-MCNC: 1.22 MG/DL (ref 0.5–1.3)
EGFRCR SERPLBLD CKD-EPI 2021: 71 ML/MIN/1.73M*2
ERYTHROCYTE [DISTWIDTH] IN BLOOD BY AUTOMATED COUNT: 12.2 % (ref 11.5–14.5)
GLUCOSE SERPL-MCNC: 96 MG/DL (ref 74–99)
GRAM STN SPEC: ABNORMAL
GRAM STN SPEC: ABNORMAL
HCT VFR BLD AUTO: 32.7 % (ref 41–52)
HGB BLD-MCNC: 11.4 G/DL (ref 13.5–17.5)
MAGNESIUM SERPL-MCNC: 1.93 MG/DL (ref 1.6–2.4)
MCH RBC QN AUTO: 30.8 PG (ref 26–34)
MCHC RBC AUTO-ENTMCNC: 34.9 G/DL (ref 32–36)
MCV RBC AUTO: 88 FL (ref 80–100)
NRBC BLD-RTO: 0 /100 WBCS (ref 0–0)
PLATELET # BLD AUTO: 347 X10*3/UL (ref 150–450)
POTASSIUM SERPL-SCNC: 4.3 MMOL/L (ref 3.5–5.3)
RBC # BLD AUTO: 3.7 X10*6/UL (ref 4.5–5.9)
SODIUM SERPL-SCNC: 133 MMOL/L (ref 136–145)
WBC # BLD AUTO: 4.4 X10*3/UL (ref 4.4–11.3)

## 2024-12-14 PROCEDURE — 83735 ASSAY OF MAGNESIUM: CPT

## 2024-12-14 PROCEDURE — 85027 COMPLETE CBC AUTOMATED: CPT

## 2024-12-14 PROCEDURE — 80048 BASIC METABOLIC PNL TOTAL CA: CPT

## 2024-12-14 PROCEDURE — 2500000002 HC RX 250 W HCPCS SELF ADMINISTERED DRUGS (ALT 637 FOR MEDICARE OP, ALT 636 FOR OP/ED)

## 2024-12-14 PROCEDURE — 99239 HOSP IP/OBS DSCHRG MGMT >30: CPT

## 2024-12-14 PROCEDURE — 36415 COLL VENOUS BLD VENIPUNCTURE: CPT

## 2024-12-14 RX ORDER — CLINDAMYCIN HYDROCHLORIDE 150 MG/1
450 CAPSULE ORAL 3 TIMES DAILY
Qty: 81 CAPSULE | Refills: 0 | Status: SHIPPED | OUTPATIENT
Start: 2024-12-15 | End: 2024-12-24

## 2024-12-14 RX ORDER — CLINDAMYCIN HYDROCHLORIDE 300 MG/1
300 CAPSULE ORAL 3 TIMES DAILY
Qty: 27 CAPSULE | Refills: 0 | Status: SHIPPED | OUTPATIENT
Start: 2024-12-15 | End: 2024-12-14

## 2024-12-14 RX ORDER — BUPRENORPHINE HYDROCHLORIDE AND NALOXONE HYDROCHLORIDE DIHYDRATE 8; 2 MG/1; MG/1
0.5 TABLET SUBLINGUAL EVERY MORNING
Qty: 1 TABLET | Refills: 0 | Status: SHIPPED | OUTPATIENT
Start: 2024-12-14 | End: 2024-12-15

## 2024-12-14 RX ORDER — BUPRENORPHINE HYDROCHLORIDE AND NALOXONE HYDROCHLORIDE DIHYDRATE 8; 2 MG/1; MG/1
1 TABLET SUBLINGUAL EVERY EVENING
Qty: 2 TABLET | Refills: 0 | Status: SHIPPED | OUTPATIENT
Start: 2024-12-14 | End: 2024-12-16

## 2024-12-14 RX ORDER — QUETIAPINE FUMARATE 100 MG/1
100 TABLET, FILM COATED ORAL NIGHTLY
Qty: 2 TABLET | Refills: 0 | Status: SHIPPED | OUTPATIENT
Start: 2024-12-14 | End: 2024-12-16

## 2024-12-14 ASSESSMENT — PAIN SCALES - GENERAL: PAINLEVEL_OUTOF10: 0 - NO PAIN

## 2024-12-14 NOTE — DISCHARGE INSTRUCTIONS
Please clean your upper lip with warm water and mild soap at least once a day. Apply petrolatum ointment or lip balm to the lip to help keep it moist.    Please follow-up with PCP within a week.  Follow-up with ENT outpatient within a week, we included the information above.  We recommend they obtain repeat hepatitis/HIV panel 6 weeks from this admission.

## 2024-12-14 NOTE — NURSING NOTE
Pt discharging home at this time. No pain or discomfort noted at time of discharge. AVS reviewed with pt and all questions answered. Pt escorted by staff to main entrance to meet ride home.

## 2024-12-20 ENCOUNTER — APPOINTMENT (OUTPATIENT)
Dept: OTOLARYNGOLOGY | Facility: CLINIC | Age: 54
End: 2024-12-20
Payer: MEDICAID

## 2024-12-20 NOTE — PROGRESS NOTES
SUBJECTIVE  Patient ID: Valente Viera is a 54 y.o. male who presents for No chief complaint on file..    ***    Review of Systems  Complete ROS negative except as noted above or on patient intake form and as above.    OBJECTIVE  Physical Exam  {Physical Exam SmartLinks:53045}    ASSESSMENT/PLAN  {Assessment/Plan SmartLinks:91343}    54 y.o. male ***    This note was created using speech recognition transcription software. Despite proofreading, typographical errors may be present that affect the meaning of the content. Please contact my office with any questions.

## 2024-12-30 ENCOUNTER — APPOINTMENT (OUTPATIENT)
Dept: OTOLARYNGOLOGY | Facility: CLINIC | Age: 54
End: 2024-12-30
Payer: MEDICAID

## 2025-01-16 ENCOUNTER — APPOINTMENT (OUTPATIENT)
Dept: OTOLARYNGOLOGY | Facility: CLINIC | Age: 55
End: 2025-01-16
Payer: MEDICAID